# Patient Record
Sex: MALE | Race: WHITE | Employment: OTHER | URBAN - METROPOLITAN AREA
[De-identification: names, ages, dates, MRNs, and addresses within clinical notes are randomized per-mention and may not be internally consistent; named-entity substitution may affect disease eponyms.]

---

## 2019-11-08 ENCOUNTER — APPOINTMENT (OUTPATIENT)
Dept: GENERAL RADIOLOGY | Age: 44
End: 2019-11-08
Attending: EMERGENCY MEDICINE
Payer: OTHER GOVERNMENT

## 2019-11-08 ENCOUNTER — HOSPITAL ENCOUNTER (EMERGENCY)
Age: 44
Discharge: HOME | End: 2019-11-08
Attending: EMERGENCY MEDICINE
Payer: OTHER GOVERNMENT

## 2019-11-08 VITALS
WEIGHT: 200 LBS | OXYGEN SATURATION: 99 % | SYSTOLIC BLOOD PRESSURE: 130 MMHG | DIASTOLIC BLOOD PRESSURE: 91 MMHG | HEIGHT: 68 IN | RESPIRATION RATE: 16 BRPM | TEMPERATURE: 98 F | HEART RATE: 83 BPM | BODY MASS INDEX: 30.31 KG/M2

## 2019-11-08 DIAGNOSIS — R42 EPISODIC LIGHTHEADEDNESS: Primary | ICD-10-CM

## 2019-11-08 PROCEDURE — 99285 EMERGENCY DEPT VISIT HI MDM: CPT

## 2019-11-08 PROCEDURE — 93010 ELECTROCARDIOGRAM REPORT: CPT

## 2019-11-08 PROCEDURE — 80048 BASIC METABOLIC PNL TOTAL CA: CPT | Performed by: EMERGENCY MEDICINE

## 2019-11-08 PROCEDURE — 99284 EMERGENCY DEPT VISIT MOD MDM: CPT

## 2019-11-08 PROCEDURE — 84484 ASSAY OF TROPONIN QUANT: CPT | Performed by: EMERGENCY MEDICINE

## 2019-11-08 PROCEDURE — 36415 COLL VENOUS BLD VENIPUNCTURE: CPT

## 2019-11-08 PROCEDURE — 85025 COMPLETE CBC W/AUTO DIFF WBC: CPT | Performed by: EMERGENCY MEDICINE

## 2019-11-08 PROCEDURE — 71046 X-RAY EXAM CHEST 2 VIEWS: CPT | Performed by: EMERGENCY MEDICINE

## 2019-11-08 PROCEDURE — 93005 ELECTROCARDIOGRAM TRACING: CPT

## 2019-11-08 RX ORDER — IBUPROFEN 400 MG/1
400 TABLET ORAL EVERY 6 HOURS PRN
COMMUNITY

## 2019-11-08 NOTE — ED INITIAL ASSESSMENT (HPI)
Pt started having lightheadedness today this afternoon felt his heart was beating fast. Felt a little sob not now but earlier when lightheaded. No nausea.

## 2019-11-08 NOTE — ED PROVIDER NOTES
Patient Seen in: [facility] Emergency Department In [place]      History   Patient presents with:  Lightheadedness    Stated Complaint: lightheaded     HPI    Patient notes a remarkable recent medical history of a sprained ankle.   He went to an urgent car None (Room air)       Current:BP (!) 130/91   Pulse 83   Temp 98.3 °F (36.8 °C) (Temporal)   Resp 16   Ht 172.7 cm (5' 8\")   Wt 90.7 kg   SpO2 99%   BMI 30.41 kg/m²         Physical Exam  General: The patient is awake, alert, conversant.   Patient answers computerized EKG interval interpretations. EKG shows sinus rhythm. There are no acute ST changes to suggest acute ischemia or infarct  Ventricular rate 84 bpm. NH interval 144 ms. QRS duration 102 ms.           MDM     Patient's feeling of lightheadedness,

## 2023-10-14 ENCOUNTER — OFFICE VISIT (OUTPATIENT)
Dept: FAMILY MEDICINE CLINIC | Facility: CLINIC | Age: 48
End: 2023-10-14
Payer: OTHER GOVERNMENT

## 2023-10-14 VITALS
WEIGHT: 210 LBS | HEIGHT: 68 IN | TEMPERATURE: 97 F | DIASTOLIC BLOOD PRESSURE: 70 MMHG | BODY MASS INDEX: 31.83 KG/M2 | RESPIRATION RATE: 16 BRPM | HEART RATE: 82 BPM | SYSTOLIC BLOOD PRESSURE: 110 MMHG | OXYGEN SATURATION: 99 %

## 2023-10-14 DIAGNOSIS — L50.9 HIVES: Primary | ICD-10-CM

## 2023-10-14 DIAGNOSIS — R21 RASH AND OTHER NONSPECIFIC SKIN ERUPTION: ICD-10-CM

## 2023-10-14 LAB
OPERATOR ID: NORMAL
POCT LOT NUMBER: NORMAL
RAPID SARS-COV-2 BY PCR: NOT DETECTED

## 2023-10-14 RX ORDER — PREDNISONE 20 MG/1
40 TABLET ORAL DAILY
Qty: 10 TABLET | Refills: 0 | Status: SHIPPED | OUTPATIENT
Start: 2023-10-14 | End: 2023-10-19

## 2024-12-05 ENCOUNTER — APPOINTMENT (OUTPATIENT)
Dept: GENERAL RADIOLOGY | Facility: HOSPITAL | Age: 49
End: 2024-12-05
Payer: OTHER GOVERNMENT

## 2024-12-05 ENCOUNTER — HOSPITAL ENCOUNTER (EMERGENCY)
Facility: HOSPITAL | Age: 49
Discharge: HOME | End: 2024-12-05
Attending: STUDENT IN AN ORGANIZED HEALTH CARE EDUCATION/TRAINING PROGRAM
Payer: OTHER GOVERNMENT

## 2024-12-05 VITALS
SYSTOLIC BLOOD PRESSURE: 127 MMHG | RESPIRATION RATE: 21 BRPM | OXYGEN SATURATION: 100 % | WEIGHT: 205 LBS | DIASTOLIC BLOOD PRESSURE: 96 MMHG | TEMPERATURE: 98 F | BODY MASS INDEX: 31 KG/M2 | HEART RATE: 72 BPM

## 2024-12-05 DIAGNOSIS — R07.9 CHEST PAIN OF UNCERTAIN ETIOLOGY: Primary | ICD-10-CM

## 2024-12-05 LAB
ALBUMIN SERPL-MCNC: 4.7 G/DL (ref 3.2–4.8)
ALBUMIN/GLOB SERPL: 1.3 {RATIO} (ref 1–2)
ALP LIVER SERPL-CCNC: 68 U/L
ALT SERPL-CCNC: 41 U/L
ANION GAP SERPL CALC-SCNC: 6 MMOL/L (ref 0–18)
APTT PPP: 27.9 SECONDS (ref 23–36)
AST SERPL-CCNC: 30 U/L (ref ?–34)
BASOPHILS # BLD AUTO: 0.05 X10(3) UL (ref 0–0.2)
BASOPHILS NFR BLD AUTO: 0.9 %
BILIRUB SERPL-MCNC: 0.9 MG/DL (ref 0.3–1.2)
BUN BLD-MCNC: 10 MG/DL (ref 9–23)
CALCIUM BLD-MCNC: 9.8 MG/DL (ref 8.7–10.4)
CHLORIDE SERPL-SCNC: 103 MMOL/L (ref 98–112)
CO2 SERPL-SCNC: 29 MMOL/L (ref 21–32)
CREAT BLD-MCNC: 0.87 MG/DL
EGFRCR SERPLBLD CKD-EPI 2021: 106 ML/MIN/1.73M2 (ref 60–?)
EOSINOPHIL # BLD AUTO: 0.14 X10(3) UL (ref 0–0.7)
EOSINOPHIL NFR BLD AUTO: 2.6 %
ERYTHROCYTE [DISTWIDTH] IN BLOOD BY AUTOMATED COUNT: 11.9 %
FLUAV + FLUBV RNA SPEC NAA+PROBE: NEGATIVE
FLUAV + FLUBV RNA SPEC NAA+PROBE: NEGATIVE
GLOBULIN PLAS-MCNC: 3.5 G/DL (ref 2–3.5)
GLUCOSE BLD-MCNC: 85 MG/DL (ref 70–99)
HCT VFR BLD AUTO: 42.1 %
HGB BLD-MCNC: 15.2 G/DL
IMM GRANULOCYTES # BLD AUTO: 0.02 X10(3) UL (ref 0–1)
IMM GRANULOCYTES NFR BLD: 0.4 %
INR BLD: 0.99 (ref 0.8–1.2)
LYMPHOCYTES # BLD AUTO: 0.77 X10(3) UL (ref 1–4)
LYMPHOCYTES NFR BLD AUTO: 14 %
MCH RBC QN AUTO: 32.3 PG (ref 26–34)
MCHC RBC AUTO-ENTMCNC: 36.1 G/DL (ref 31–37)
MCV RBC AUTO: 89.6 FL
MONOCYTES # BLD AUTO: 0.53 X10(3) UL (ref 0.1–1)
MONOCYTES NFR BLD AUTO: 9.7 %
NEUTROPHILS # BLD AUTO: 3.98 X10 (3) UL (ref 1.5–7.7)
NEUTROPHILS # BLD AUTO: 3.98 X10(3) UL (ref 1.5–7.7)
NEUTROPHILS NFR BLD AUTO: 72.4 %
NT-PROBNP SERPL-MCNC: <35 PG/ML (ref ?–125)
OSMOLALITY SERPL CALC.SUM OF ELEC: 284 MOSM/KG (ref 275–295)
PLATELET # BLD AUTO: 184 10(3)UL (ref 150–450)
POTASSIUM SERPL-SCNC: 3.6 MMOL/L (ref 3.5–5.1)
PROT SERPL-MCNC: 8.2 G/DL (ref 5.7–8.2)
PROTHROMBIN TIME: 13.2 SECONDS (ref 11.6–14.8)
RBC # BLD AUTO: 4.7 X10(6)UL
RSV RNA SPEC NAA+PROBE: NEGATIVE
SARS-COV-2 RNA RESP QL NAA+PROBE: NOT DETECTED
SODIUM SERPL-SCNC: 138 MMOL/L (ref 136–145)
TROPONIN I SERPL HS-MCNC: 3 NG/L
WBC # BLD AUTO: 5.5 X10(3) UL (ref 4–11)

## 2024-12-05 PROCEDURE — 83880 ASSAY OF NATRIURETIC PEPTIDE: CPT

## 2024-12-05 PROCEDURE — 85025 COMPLETE CBC W/AUTO DIFF WBC: CPT

## 2024-12-05 PROCEDURE — 85610 PROTHROMBIN TIME: CPT | Performed by: STUDENT IN AN ORGANIZED HEALTH CARE EDUCATION/TRAINING PROGRAM

## 2024-12-05 PROCEDURE — 99284 EMERGENCY DEPT VISIT MOD MDM: CPT

## 2024-12-05 PROCEDURE — 85730 THROMBOPLASTIN TIME PARTIAL: CPT

## 2024-12-05 PROCEDURE — 36415 COLL VENOUS BLD VENIPUNCTURE: CPT

## 2024-12-05 PROCEDURE — 93010 ELECTROCARDIOGRAM REPORT: CPT

## 2024-12-05 PROCEDURE — 85025 COMPLETE CBC W/AUTO DIFF WBC: CPT | Performed by: STUDENT IN AN ORGANIZED HEALTH CARE EDUCATION/TRAINING PROGRAM

## 2024-12-05 PROCEDURE — 80053 COMPREHEN METABOLIC PANEL: CPT

## 2024-12-05 PROCEDURE — 0241U SARS-COV-2/FLU A AND B/RSV BY PCR (GENEXPERT): CPT | Performed by: STUDENT IN AN ORGANIZED HEALTH CARE EDUCATION/TRAINING PROGRAM

## 2024-12-05 PROCEDURE — 93005 ELECTROCARDIOGRAM TRACING: CPT

## 2024-12-05 PROCEDURE — 84484 ASSAY OF TROPONIN QUANT: CPT | Performed by: STUDENT IN AN ORGANIZED HEALTH CARE EDUCATION/TRAINING PROGRAM

## 2024-12-05 PROCEDURE — 85730 THROMBOPLASTIN TIME PARTIAL: CPT | Performed by: STUDENT IN AN ORGANIZED HEALTH CARE EDUCATION/TRAINING PROGRAM

## 2024-12-05 PROCEDURE — 83880 ASSAY OF NATRIURETIC PEPTIDE: CPT | Performed by: STUDENT IN AN ORGANIZED HEALTH CARE EDUCATION/TRAINING PROGRAM

## 2024-12-05 PROCEDURE — 71045 X-RAY EXAM CHEST 1 VIEW: CPT

## 2024-12-05 PROCEDURE — 84484 ASSAY OF TROPONIN QUANT: CPT

## 2024-12-05 PROCEDURE — 99285 EMERGENCY DEPT VISIT HI MDM: CPT

## 2024-12-05 PROCEDURE — 80053 COMPREHEN METABOLIC PANEL: CPT | Performed by: STUDENT IN AN ORGANIZED HEALTH CARE EDUCATION/TRAINING PROGRAM

## 2024-12-05 PROCEDURE — 85610 PROTHROMBIN TIME: CPT

## 2024-12-05 NOTE — ED INITIAL ASSESSMENT (HPI)
Pt to ER for mike and chest tightness x3 days. Pt reports checking his O2 sat before coming to ER and states he was at 90% at rest. Pt states he does feel mike with exertion but does not feel his normal self at rest either.

## 2024-12-06 NOTE — ED PROVIDER NOTES
History     Chief Complaint   Patient presents with    Difficulty Breathing    Chest Pain Angina       HPI    49 year old male presents with chest tightness.  For about 3 days now he has been feeling central chest tightness when he exerts himself, occasionally associated dyspnea.  No associated nausea or diaphoresis.  He sometimes feels similar symptoms with eating, has a prior history of eosinophilic esophagitis.  Not currently on antiacids.  He denies any cardiac disease in his father at his age.  No history of hypertension, hyperlipidemia, diabetes.  Wife does state that he has been under more stress recently not sleeping well.  Patient is asymptomatic at this time.          History reviewed. No pertinent past medical history.    History reviewed. No pertinent surgical history.    No pertinent social history.                 Physical Exam     ED Triage Vitals   BP 12/05/24 1706 152/84   Pulse 12/05/24 1706 84   Resp 12/05/24 1706 18   Temp 12/05/24 1703 97.7 °F (36.5 °C)   Temp src 12/05/24 1703 Temporal   SpO2 12/05/24 1706 100 %   O2 Device 12/05/24 1706 None (Room air)       Physical Exam  Constitutional:       General: He is not in acute distress.  Eyes:      Extraocular Movements: Extraocular movements intact.   Cardiovascular:      Rate and Rhythm: Normal rate and regular rhythm.      Pulses: Normal pulses.   Pulmonary:      Effort: Pulmonary effort is normal. No respiratory distress.      Breath sounds: Normal breath sounds.   Musculoskeletal:         General: No swelling or deformity.      Cervical back: Normal range of motion.   Skin:     General: Skin is warm.   Neurological:      General: No focal deficit present.      Mental Status: He is alert.              ED Course     Labs Reviewed   CBC WITH DIFFERENTIAL WITH PLATELET - Abnormal; Notable for the following components:       Result Value    Lymphocyte Absolute 0.77 (*)     All other components within normal limits   COMP METABOLIC PANEL (14) -  Normal   TROPONIN I HIGH SENSITIVITY - Normal   PRO BETA NATRIURETIC PEPTIDE - Normal   PROTHROMBIN TIME (PT) - Normal   PTT, ACTIVATED - Normal   SARS-COV-2/FLU A AND B/RSV BY PCR (GENEXPERT) - Normal    Narrative:     This test is intended for the qualitative detection and differentiation of SARS-CoV-2, influenza A, influenza B, and respiratory syncytial virus (RSV) viral RNA in nasopharyngeal or nares swabs from individuals suspected of respiratory viral infection consistent with COVID-19 by their healthcare provider. Signs and symptoms of respiratory viral infection due to SARS-CoV-2, influenza, and RSV can be similar.    Test performed using the Xpert Xpress SARS-CoV-2/FLU/RSV (real time RT-PCR)  assay on the [employer] instrument, [employer], [place] [address].   This test is being used under the Food and Drug Administration's Emergency Use Authorization.    The authorized Fact Sheet for Healthcare Providers for this assay is available upon request from the laboratory.     XR CHEST AP PORTABLE  (CPT=71045)    Result Date: 12/5/2024  CONCLUSION: No acute cardiopulmonary abnormality.   LOCATION:  [facility]      Dictated by (CST): Ariel Turk MD on 12/05/2024 at 5:54 PM     Finalized by (CST): Ariel Turk MD on 12/05/2024 at 5:54 PM            MDM     Vitals:    12/05/24 1706 12/05/24 2000 12/05/24 2030 12/05/24 2100   BP: 152/84 (!) 135/92 (!) 132/91 (!) 127/96   Pulse: 84 78 74 72   Resp: 18 13 23 21   Temp:       TempSrc:       SpO2: 100% 100% 100% 100%   Weight: 93 kg          Chest pain/dyspnea.  Differential includes angina, GERD, pleurisy, costochondritis, anemia.  Patient is moving air well with clear lungs, no other traditional risk factors.  PERC negative r/o PE    HEART 2    ED Course as of 12/05/24 2151  ------------------------------------------------------------  Time: 12/05 2015  Comment: Labs with reassuring hemoglobin and renal function.  Troponin negative.  BNP is  low  ------------------------------------------------------------  Time: 12/05 2015  Comment: CXR interpretation by me with no concerning acute findings    ------------------------------------------------------------  Time: 12/05 2018  Comment: EKG interpretation by me: EKG sinus rhythm at a rate of 87, axis nomal, no concerning acute ischemic ST changes, incomplete rbbb appears similar to prior EKGs    ------------------------------------------------------------  Time: 12/05 2107  Comment: I had a very long discussion with patient and wife at bedside regarding his results and his workup.  Advised that the next steps in management would likely be a stress test, offered admission given his symptoms versus close outpatient follow-up with [facility].  Patient prefers to go home.  I discussed all risks and benefits and he understands.  Strict return precautions given.  I consulted with [facility] to help expedite workup for patient.  He is hemodynamically stable and remains comfortable here.         Disposition and Plan     Clinical Impression:  1. Chest pain of uncertain etiology        Disposition:  Discharge    Follow-up:  RUBY SYLVESTER  [address] [place] [id number] [phone]  Follow up  Cardiology group will call you tomorrow for your appointment for the stress test and follow-up appointment      Medications Prescribed:  Discharge Medication List as of 12/5/2024  9:24 PM

## 2024-12-07 LAB
ATRIAL RATE: 87 BPM
P AXIS: 40 DEGREES
P-R INTERVAL: 142 MS
Q-T INTERVAL: 368 MS
QRS DURATION: 102 MS
QTC CALCULATION (BEZET): 442 MS
R AXIS: 32 DEGREES
T AXIS: 22 DEGREES
VENTRICULAR RATE: 87 BPM

## 2024-12-18 ENCOUNTER — HOSPITAL ENCOUNTER (INPATIENT)
Facility: HOSPITAL | Age: 49
LOS: 9 days | Discharge: HOME HEALTH | End: 2024-12-27
Attending: EMERGENCY MEDICINE | Admitting: HOSPITALIST
Payer: OTHER GOVERNMENT

## 2024-12-18 ENCOUNTER — APPOINTMENT (OUTPATIENT)
Dept: GENERAL RADIOLOGY | Facility: HOSPITAL | Age: 49
End: 2024-12-18
Attending: EMERGENCY MEDICINE
Payer: OTHER GOVERNMENT

## 2024-12-18 DIAGNOSIS — R07.9 ACUTE CHEST PAIN: Primary | ICD-10-CM

## 2024-12-18 DIAGNOSIS — J90 PLEURAL EFFUSION: ICD-10-CM

## 2024-12-18 DIAGNOSIS — G89.18 POST-OP PAIN: ICD-10-CM

## 2024-12-18 LAB
ALBUMIN SERPL-MCNC: 4.6 G/DL (ref 3.2–4.8)
ALBUMIN/GLOB SERPL: 1.6 {RATIO} (ref 1–2)
ALP LIVER SERPL-CCNC: 63 U/L
ALT SERPL-CCNC: 40 U/L
ANION GAP SERPL CALC-SCNC: 5 MMOL/L (ref 0–18)
AST SERPL-CCNC: 31 U/L (ref ?–34)
ATRIAL RATE: 66 BPM
BASOPHILS # BLD AUTO: 0.03 X10(3) UL (ref 0–0.2)
BASOPHILS NFR BLD AUTO: 0.6 %
BILIRUB SERPL-MCNC: 1.1 MG/DL (ref 0.3–1.2)
BUN BLD-MCNC: 11 MG/DL (ref 9–23)
CALCIUM BLD-MCNC: 9.6 MG/DL (ref 8.7–10.4)
CHLORIDE SERPL-SCNC: 108 MMOL/L (ref 98–112)
CO2 SERPL-SCNC: 26 MMOL/L (ref 21–32)
CREAT BLD-MCNC: 0.9 MG/DL
D DIMER PPP FEU-MCNC: 0.41 UG/ML FEU (ref ?–0.5)
EGFRCR SERPLBLD CKD-EPI 2021: 105 ML/MIN/1.73M2 (ref 60–?)
EOSINOPHIL # BLD AUTO: 0.19 X10(3) UL (ref 0–0.7)
EOSINOPHIL NFR BLD AUTO: 3.8 %
ERYTHROCYTE [DISTWIDTH] IN BLOOD BY AUTOMATED COUNT: 11.9 %
FLUAV + FLUBV RNA SPEC NAA+PROBE: NEGATIVE
FLUAV + FLUBV RNA SPEC NAA+PROBE: NEGATIVE
GLOBULIN PLAS-MCNC: 2.9 G/DL (ref 2–3.5)
GLUCOSE BLD-MCNC: 90 MG/DL (ref 70–99)
HCT VFR BLD AUTO: 41.4 %
HGB BLD-MCNC: 14.5 G/DL
IMM GRANULOCYTES # BLD AUTO: 0.01 X10(3) UL (ref 0–1)
IMM GRANULOCYTES NFR BLD: 0.2 %
LYMPHOCYTES # BLD AUTO: 1.11 X10(3) UL (ref 1–4)
LYMPHOCYTES NFR BLD AUTO: 22.3 %
MCH RBC QN AUTO: 31.5 PG (ref 26–34)
MCHC RBC AUTO-ENTMCNC: 35 G/DL (ref 31–37)
MCV RBC AUTO: 90 FL
MONOCYTES # BLD AUTO: 0.36 X10(3) UL (ref 0.1–1)
MONOCYTES NFR BLD AUTO: 7.2 %
NEUTROPHILS # BLD AUTO: 3.28 X10 (3) UL (ref 1.5–7.7)
NEUTROPHILS # BLD AUTO: 3.28 X10(3) UL (ref 1.5–7.7)
NEUTROPHILS NFR BLD AUTO: 65.9 %
NT-PROBNP SERPL-MCNC: <35 PG/ML (ref ?–125)
OSMOLALITY SERPL CALC.SUM OF ELEC: 287 MOSM/KG (ref 275–295)
P AXIS: 23 DEGREES
P-R INTERVAL: 152 MS
PLATELET # BLD AUTO: 216 10(3)UL (ref 150–450)
POTASSIUM SERPL-SCNC: 3.7 MMOL/L (ref 3.5–5.1)
PROT SERPL-MCNC: 7.5 G/DL (ref 5.7–8.2)
Q-T INTERVAL: 404 MS
QRS DURATION: 104 MS
QTC CALCULATION (BEZET): 423 MS
R AXIS: 32 DEGREES
RBC # BLD AUTO: 4.6 X10(6)UL
RSV RNA SPEC NAA+PROBE: NEGATIVE
SARS-COV-2 RNA RESP QL NAA+PROBE: NOT DETECTED
SODIUM SERPL-SCNC: 139 MMOL/L (ref 136–145)
T AXIS: 23 DEGREES
TROPONIN I SERPL HS-MCNC: <3 NG/L
TROPONIN I SERPL HS-MCNC: <3 NG/L
VENTRICULAR RATE: 66 BPM
WBC # BLD AUTO: 5 X10(3) UL (ref 4–11)

## 2024-12-18 PROCEDURE — 71045 X-RAY EXAM CHEST 1 VIEW: CPT | Performed by: EMERGENCY MEDICINE

## 2024-12-18 PROCEDURE — 99223 1ST HOSP IP/OBS HIGH 75: CPT | Performed by: HOSPITALIST

## 2024-12-18 RX ORDER — SODIUM CHLORIDE 9 MG/ML
INJECTION, SOLUTION INTRAVENOUS CONTINUOUS
Status: DISCONTINUED | OUTPATIENT
Start: 2024-12-18 | End: 2024-12-24

## 2024-12-18 RX ORDER — POTASSIUM CHLORIDE 1500 MG/1
40 TABLET, EXTENDED RELEASE ORAL ONCE
Status: COMPLETED | OUTPATIENT
Start: 2024-12-18 | End: 2024-12-18

## 2024-12-18 RX ORDER — SODIUM CHLORIDE 9 MG/ML
INJECTION, SOLUTION INTRAVENOUS
Status: ACTIVE | OUTPATIENT
Start: 2024-12-19 | End: 2024-12-19

## 2024-12-18 RX ORDER — ECHINACEA PURPUREA EXTRACT 125 MG
1 TABLET ORAL
Status: DISCONTINUED | OUTPATIENT
Start: 2024-12-18 | End: 2024-12-27

## 2024-12-18 RX ORDER — PROCHLORPERAZINE EDISYLATE 5 MG/ML
5 INJECTION INTRAMUSCULAR; INTRAVENOUS EVERY 8 HOURS PRN
Status: DISCONTINUED | OUTPATIENT
Start: 2024-12-18 | End: 2024-12-24

## 2024-12-18 RX ORDER — ENOXAPARIN SODIUM 100 MG/ML
40 INJECTION SUBCUTANEOUS NIGHTLY
Status: DISCONTINUED | OUTPATIENT
Start: 2024-12-18 | End: 2024-12-19

## 2024-12-18 RX ORDER — METOPROLOL TARTRATE 25 MG/1
12.5 TABLET, FILM COATED ORAL 2 TIMES DAILY
COMMUNITY
Start: 2024-12-13 | End: 2024-12-27

## 2024-12-18 RX ORDER — ATORVASTATIN CALCIUM 80 MG/1
80 TABLET, FILM COATED ORAL DAILY
COMMUNITY

## 2024-12-18 RX ORDER — CLOPIDOGREL BISULFATE 75 MG/1
75 TABLET ORAL DAILY
COMMUNITY

## 2024-12-18 RX ORDER — ACETAMINOPHEN 500 MG
500 TABLET ORAL EVERY 4 HOURS PRN
Status: DISCONTINUED | OUTPATIENT
Start: 2024-12-18 | End: 2024-12-24

## 2024-12-18 RX ORDER — ONDANSETRON 2 MG/ML
4 INJECTION INTRAMUSCULAR; INTRAVENOUS EVERY 6 HOURS PRN
Status: DISCONTINUED | OUTPATIENT
Start: 2024-12-18 | End: 2024-12-24

## 2024-12-18 NOTE — PROGRESS NOTES
Call received from Annabelle ([email or web address]). Patient is a [facility] patient. [facility] with no available beds at this time. Call placed to 72 hour line and insurance authorization obtained for 30 days. If patient stays longer than 30 days will need re-authorization.   72 hour line #: [phone]  [place] authorization #: [id number]  Notification ID #: [id number]

## 2024-12-18 NOTE — ED PROVIDER NOTES
Patient Seen in: [facility] Emergency Department      History     Chief Complaint   Patient presents with    Difficulty Breathing     98% RA 80 HR  States he feels short of breath    Abnormal EKG     States he had an abnormal stress test on Monday. But no action was taken at the time.      Stated Complaint: WILLIE    Subjective:   HPI      49-year-old male complaint of chest pain the patient had a [facility] medicine stress test that showed some abnormality with reversible ischemia in the distribution of the mid LAD.  He has been having intermittent chest discomfort sometimes exertional over the last couple of weeks it comes and goes lasts only a couple minutes does not seem to be food related he does have a history of reflux.  No nausea vomiting diaphoresis no fever.  He has family history of heart disease but [place] any other risk factors.    Objective:     Past Medical History:    GERD (gastroesophageal reflux disease)              History reviewed. No pertinent surgical history.             Social History     Socioeconomic History    Marital status: Single   Tobacco Use    Smoking status: Never   Vaping Use    Vaping status: Never Used   Substance and Sexual Activity    Alcohol use: Yes     Comment: 1-2 drinks a day    Drug use: Yes     Types: Cannabis     Comment: CBD gummies 1 a day                  Physical Exam     ED Triage Vitals [12/18/24 0740]   /85   Pulse 69   Resp 22   Temp 97.6 °F (36.4 °C)   Temp src    SpO2 98 %   O2 Device None (Room air)       Current Vitals:   Vital Signs  BP: 141/88  Pulse: 72  Resp: 20  Temp: 97.6 °F (36.4 °C)  MAP (mmHg): (!) 104    Oxygen Therapy  SpO2: 100 %  O2 Device: None (Room air)        Physical Exam  Alert and oriented ×3 in no acute distress.  HEENT exam within normal limits.  Neck supple without lymphadenopathy or JVD.  Lungs are clear to auscultation.  Cardiovascular exam regular rate and  rhythm without murmurs.  Abdomen is soft and nontender without rebound or guarding.  Extremities no clubbing cyanosis or edema.  Skin there is no rash.  Neurologic exam is within normal limits no focal deficits.    ED Course     Labs Reviewed   COMP METABOLIC PANEL (14) - Normal   TROPONIN I HIGH SENSITIVITY - Normal   TROPONIN I HIGH SENSITIVITY - Normal   D-DIMER - Normal   PRO BETA NATRIURETIC PEPTIDE - Normal   SARS-COV-2/FLU A AND B/RSV BY PCR (GENEXPERT) - Normal    Narrative:     This test is intended for the qualitative detection and differentiation of SARS-CoV-2, influenza A, influenza B, and respiratory syncytial virus (RSV) viral RNA in nasopharyngeal or nares swabs from individuals suspected of respiratory viral infection consistent with COVID-19 by their healthcare provider. Signs and symptoms of respiratory viral infection due to SARS-CoV-2, influenza, and RSV can be similar.    Test performed using the Xpert Xpress SARS-CoV-2/FLU/RSV (real time RT-PCR)  assay on the [employer] instrument, [employer], [place] [address].   This test is being used under the Food and Drug Administration's Emergency Use Authorization.    The authorized Fact Sheet for Healthcare Providers for this assay is available upon request from the laboratory.   CBC WITH DIFFERENTIAL WITH PLATELET   RAINBOW DRAW GOLD     EKG    Rate, intervals and axes as noted on EKG Report.  Rate: 66  Rhythm: Sinus Rhythm  Reading: Incomplete right bundle branch block borderline EKG            Abnormal Labs Reviewed - No abnormal labs to display  Abnormal Labs Reviewed - No abnormal labs to display      XR CHEST AP PORTABLE  (CPT=71045)    Result Date: 12/18/2024  CONCLUSION:  No acute radiographic findings.   LOCATION:  [facility]      Dictated by (CST): Ariel Villarreal MD on 12/18/2024 at 9:36 AM     Finalized by (CST): Ariel Villarreal MD on 12/18/2024 at 9:37 AM       XR CHEST AP PORTABLE  (CPT=71045)    Result Date: 12/5/2024  CONCLUSION: No acute  cardiopulmonary abnormality.   LOCATION:  [facility]      Dictated by (CST): Ariel Turk MD on 12/05/2024 at 5:54 PM     Finalized by (CST): Ariel Turk MD on 12/05/2024 at 5:54 PM      Chest x-ray unremarkable images independent reviewed       MDM      Initial differential diagnosis considered but not limited to includes GERD reflux ACS pulmonary embolus muscular chest pain    Admission disposition: 12/18/2024 10:15 AM           Medical Decision Making  Patient has intermittent chest pain with exertion and abnormal stress test was admitted with cardiology on consult.    Disposition and Plan     Clinical Impression:  1. Acute chest pain         Disposition:  Admit  12/18/2024 10:15 am    Follow-up:  No follow-up provider specified.        Medications Prescribed:  Current Discharge Medication List              Supplementary Documentation:         Hospital Problems       Present on Admission  Date Reviewed: 10/14/2023            ICD-10-CM Noted POA    * (Principal) Acute chest pain R07.9 12/18/2024 Unknown

## 2024-12-18 NOTE — ED INITIAL ASSESSMENT (HPI)
Pt had a stress test done on Monday that had an abnormal result of a blockage in the left anterior descending artery. They told him that they would call to schedule an angiogram but they haven't yet which left him and his family worried. Pt says he feels intermittent chest pain that he's had for about 2 weeks which is what had him take the stress test in general. Pt feels short of breath but not any more short of breath than when this all started. Pt's EKG shows incomplete right bundle branch block.

## 2024-12-18 NOTE — PLAN OF CARE
The plan is to move forward with aspirin desensitization tomorrow am. Will transfer to ccu tomorrow morning just prior to shift change. CCU charge nurses and nursing supervisor aware. Order placed for the transfer.  Will enter orders for desensitization tomorrow morning when I get in.  Keep NPO. Plan will be for [facility] tomorrow afternoon if all goes well.     Treatment team updated including current RN, Dr Farrell, CCU team and nursing supervisor. I have reviewed with CCU pharmacist as well. Did attempt to consult on call allergist but no response from on call physician.     Leslie Mujica NP  12/18/2024  3:30 PM  [phone]      ]

## 2024-12-18 NOTE — ED QUICK NOTES
Orders for admission, patient is aware of plan and ready to go upstairs. Any questions, please call ED RN Rosina EVERETT at extension [phone].     Patient Covid vaccination status: Fully vaccinated     COVID Test Ordered in ED: SARS-CoV-2/Flu A and B/RSV by PCR (GeneXpert)    COVID Suspicion at Admission: N/A    Running Infusions:  None    Mental Status/LOC at time of transport: Aox4; extensive review w/pt and family done in ED regarding pt dx, medications, and concerns    Other pertinent information: Family at bedside      CIWA score: N/A   NIH score:  N/A

## 2024-12-18 NOTE — CONSULTS
[facility]  Cardiology Consultation    Drew Ozuna Patient Status:  Inpatient    1975 MRN [id number]   Location [facility] 8NE-A Attending Marcelino Madrid*   Hosp Day # 0 PCP PHYSICIAN NONSTAFF     Reason for Consultation:  Abnormal stress test, Chest pain    History of Present Illness:  Drew Ozuna is a a(n) 49 year old male who presents with chest pain.     He has noticed ongoing worsening chest pain over the past 2 - 3 weeks. Symptoms are exertional. Different from his reflux.   He was in the emergency room earlier this month with chest pain. He [facility] He was noted to have an abnormal stress study and was planned for a cardiac catheterization. However, he had episodes of chest pain - last one last night and decided to come to ER with worsening symptoms.   Wife and mother at bedside. Mom just had CABG done      History:  Past Medical History:    GERD (gastroesophageal reflux disease)    Visual impairment     History reviewed. No pertinent surgical history.  Family History   Problem Relation Age of Onset    Diabetes Mother     Heart Disease Mother       reports that he has never smoked. He does not have any smokeless tobacco history on file. He reports current alcohol use. He reports current drug use. Drug: Cannabis.    Allergies:  Allergies[1]    Medications:  No current facility-administered medications for this encounter.    Review of Systems:  A comprehensive review of systems was negative if not otherwise mention in above HPI.    BP (!) 119/94 (BP Location: Left arm)   Pulse 76   Temp 97.9 °F (36.6 °C) (Oral)   Resp 20   Ht 5' 8\" (1.727 m)   Wt 198 lb 6.6 oz (90 kg)   SpO2 99%   BMI 30.17 kg/m²   Temp (24hrs), Av.8 °F (36.6 °C), Min:97.6 °F (36.4 °C), Max:97.9 °F (36.6 °C)     No intake or output data in the 24 hours ending 24 1258  Wt Readings from Last 3 Encounters:    12/18/24 198 lb 6.6 oz (90 kg)   12/05/24 205 lb (93 kg)   10/14/23 210 lb (95.3 kg)       Physical Exam:   General: Alert and oriented x 3. No apparent distress. No respiratory or constitutional distress.  HEENT: Normocephalic  Neck: No JVD  Cardiac: Regular rate and rhythm. S1, S2 normal. No murmur  Lungs: Clear without wheezes, rales, rhonchi or dullness.  Abdomen: Soft, non-tender.   Extremities: Without clubbing, cyanosis or edema.  Peripheral pulses are 2+.  Neurologic: Alert and oriented, normal affect.  Skin: Warm and dry.     Laboratory Data:  Lab Results   Component Value Date    WBC 5.0 12/18/2024    HGB 14.5 12/18/2024    HCT 41.4 12/18/2024    .0 12/18/2024    CREATSERUM 0.90 12/18/2024    BUN 11 12/18/2024     12/18/2024    K 3.7 12/18/2024     12/18/2024    CO2 26.0 12/18/2024    GLU 90 12/18/2024    CA 9.6 12/18/2024    ALB 4.6 12/18/2024    ALKPHO 63 12/18/2024    BILT 1.1 12/18/2024    TP 7.5 12/18/2024    AST 31 12/18/2024    ALT 40 12/18/2024    DDIMER 0.41 12/18/2024     Recent Labs   Lab 12/18/24  0826 12/18/24  1020   TROPHS <3 <3     Imaging:  Reviewed    Impression:    Chest pain/Unstable angina  Abnormal stress test  Aspirin allergy    Recommendations:    Patient reports chest pain is recurrent and increasing in frequency.  He had a stress test which was abnormal.  Patient presentation is concerning for unstable angina.  Chest pain was last night.  Troponin negative.  Schedule for cardiac catheterization.  Discussed in detail with patient who is agreeable.    Reports allergy to aspirin.  Describes having swelling in his scrotum since when he was 10 years old.  No difficulty breathing or shortness of breath with aspirin.  Denies anaphylaxis.  Aspirin desensitization prior to cardiac catheterization.  This was discussed in detail with patient and family.  They are in agreement and would wish for desensitization prior to procedure.  Indication, risk and benefits were  discussed in detail.    Hold off Plavix until cardiac catheterization.    Will check an echocardiogram.    Cont to follow.    Thank you for allowing me to participate in the care of your patient.    Aristeo Stovall MD  12/18/2024  12:58 PM         [1]   Allergies  Allergen Reactions    Asa [Aspirin] SWELLING

## 2024-12-18 NOTE — ED QUICK NOTES
RN Station report received from LORI Gilbert. Patient is consult w/cardiology and results at this time. Plan of care reviewed. Detailed walk through of care, medications, and diagnosis discussed w/patient and family by previous RN.

## 2024-12-18 NOTE — CM/SW NOTE
Chart reviewed for discharge planning. Noted that pt received a stress test  on Monday at [facility]. [facility] was supposed to call to schedule an angiogram, but they did not, and pt is now at [facility]. SW reached out to [facility] to inquire if pt needs to transfer to [facility]. Await response from [facility].     Addendum: SW received call from [facility] and stated that [facility] has authorized pt to receive hospital care at [facility] and does not need to transfer. They have authorized pt's stay for 30 days.    Annabelle DIOR, LSW  Discharge Planner

## 2024-12-18 NOTE — H&P
[facility]     Drew Ozuna Patient Status:  Inpatient    1975 MRN [id number]   Location [facility] 8NE-A Attending Marcelino Madrid*   Hosp Day # 0 PCP PHYSICIAN NONSTAFF     Chief Complaint: Chest pain    Subjective:    History of Present Illness:     Drew Ozuna is a 49 year old male with past medical history significant for GERD presents to the ER with c/o chest pain over the past 2-3 weeks.  Pt states the pain is located in the middle of his chest and he describes it as a pressure.  He notices it is worse with exertion.  His mother had a heart attack 3 months.  He was seen in the ER on  and was [facility]  He was told it was abnormal.  He states that he was told he need an angiogram but since then his symptoms have worsened so he came into the ER.  He denies pain at this time.     History/Other:    Past Medical History:  Past Medical History:    GERD (gastroesophageal reflux disease)    Visual impairment     Past Surgical History:   History reviewed. No pertinent surgical history.   Family History:   Family History   Problem Relation Age of Onset    Diabetes Mother     Heart Disease Mother      Social History:    reports that he has never smoked. He does not have any smokeless tobacco history on file. He reports current alcohol use. He reports current drug use. Drug: Cannabis.     Allergies: Allergies[1]    Medications:  Medications Ordered Prior to Encounter[2]    Review of Systems:   A comprehensive review of systems was completed.    Pertinent positives and negatives noted in the HPI.    Objective:   Physical Exam:    BP (!) 119/94 (BP Location: Left arm)   Pulse 76   Temp 97.9 °F (36.6 °C) (Oral)   Resp 20   Ht 5' 8\" (1.727 m)   Wt 198 lb 6.6 oz (90 kg)   SpO2 99%   BMI 30.17 kg/m²   General: No acute distress, Alert  Respiratory: No rhonchi, no wheezes  Cardiovascular:  S1, S2. Regular rate and rhythm  Abdomen: Soft, Non-tender, non-distended, positive bowel sounds  Neuro: No new focal deficits  Extremities: No edema      Results:    Labs:      Labs Last 24 Hours:    Recent Labs   Lab 12/18/24  0826   RBC 4.60   HGB 14.5   HCT 41.4   MCV 90.0   MCH 31.5   MCHC 35.0   RDW 11.9   NEPRELIM 3.28   WBC 5.0   .0       Recent Labs   Lab 12/18/24  0826   GLU 90   BUN 11   CREATSERUM 0.90   EGFRCR 105   CA 9.6   ALB 4.6      K 3.7      CO2 26.0   ALKPHO 63   AST 31   ALT 40   BILT 1.1   TP 7.5       Lab Results   Component Value Date    INR 0.99 12/05/2024       Recent Labs   Lab 12/18/24  0826 12/18/24  1020   TROPHS <3 <3       Recent Labs   Lab 12/18/24  0826   PBNP <35       No results for input(s): \"PCT\" in the last 168 hours.    Imaging: Imaging data reviewed in Epic.    Assessment & Plan:      #Chest pain  Abnormal stress test  Angiogram tomorrow  No need for heparin  Cardiology following  Monitor on telemetry    #GERD    #Obesity, BMI 30        Plan of care discussed with pt and RN.    Keven Farrell MD    Supplementary Documentation:     The 21st Century Cures Act makes medical notes like these available to patients in the interest of transparency. Please be advised this is a medical document. Medical documents are intended to carry relevant information, facts as evident, and the clinical opinion of the practitioner. The medical note is intended as peer to peer communication and may appear blunt or direct. It is written in medical language and may contain abbreviations or verbiage that are unfamiliar.                                       [1]   Allergies  Allergen Reactions    Asa [Aspirin] SWELLING   [2]   No current facility-administered medications on file prior to encounter.     Current Outpatient Medications on File Prior to Encounter   Medication Sig Dispense Refill    metoprolol tartrate 25 MG Oral Tab Take 0.5 tablets (12.5 mg total) by mouth 2 (two) times  daily.      atorvastatin 80 MG Oral Tab Take 1 tablet (80 mg total) by mouth daily.      clopidogrel 75 MG Oral Tab Take 1 tablet (75 mg total) by mouth daily.

## 2024-12-19 ENCOUNTER — APPOINTMENT (OUTPATIENT)
Dept: INTERVENTIONAL RADIOLOGY/VASCULAR | Facility: HOSPITAL | Age: 49
End: 2024-12-19
Attending: NURSE PRACTITIONER
Payer: OTHER GOVERNMENT

## 2024-12-19 ENCOUNTER — APPOINTMENT (OUTPATIENT)
Dept: CV DIAGNOSTICS | Facility: HOSPITAL | Age: 49
End: 2024-12-19
Attending: NURSE PRACTITIONER
Payer: OTHER GOVERNMENT

## 2024-12-19 LAB
ANION GAP SERPL CALC-SCNC: 6 MMOL/L (ref 0–18)
BUN BLD-MCNC: 11 MG/DL (ref 9–23)
CALCIUM BLD-MCNC: 9.2 MG/DL (ref 8.7–10.4)
CHLORIDE SERPL-SCNC: 106 MMOL/L (ref 98–112)
CHOLEST SERPL-MCNC: 190 MG/DL (ref ?–200)
CO2 SERPL-SCNC: 26 MMOL/L (ref 21–32)
CREAT BLD-MCNC: 0.86 MG/DL
EGFRCR SERPLBLD CKD-EPI 2021: 106 ML/MIN/1.73M2 (ref 60–?)
ERYTHROCYTE [DISTWIDTH] IN BLOOD BY AUTOMATED COUNT: 11.7 %
GLUCOSE BLD-MCNC: 111 MG/DL (ref 70–99)
GLUCOSE BLD-MCNC: 89 MG/DL (ref 70–99)
HCT VFR BLD AUTO: 39.3 %
HDLC SERPL-MCNC: 31 MG/DL (ref 40–59)
HGB BLD-MCNC: 13.8 G/DL
LDLC SERPL CALC-MCNC: 132 MG/DL (ref ?–100)
MCH RBC QN AUTO: 31.9 PG (ref 26–34)
MCHC RBC AUTO-ENTMCNC: 35.1 G/DL (ref 31–37)
MCV RBC AUTO: 91 FL
MRSA DNA SPEC QL NAA+PROBE: NEGATIVE
NONHDLC SERPL-MCNC: 159 MG/DL (ref ?–130)
OSMOLALITY SERPL CALC.SUM OF ELEC: 286 MOSM/KG (ref 275–295)
PLATELET # BLD AUTO: 217 10(3)UL (ref 150–450)
POTASSIUM SERPL-SCNC: 3.8 MMOL/L (ref 3.5–5.1)
POTASSIUM SERPL-SCNC: 3.8 MMOL/L (ref 3.5–5.1)
RBC # BLD AUTO: 4.32 X10(6)UL
SODIUM SERPL-SCNC: 138 MMOL/L (ref 136–145)
TRIGL SERPL-MCNC: 150 MG/DL (ref 30–149)
VLDLC SERPL CALC-MCNC: 27 MG/DL (ref 0–30)
WBC # BLD AUTO: 5.6 X10(3) UL (ref 4–11)

## 2024-12-19 PROCEDURE — 93306 TTE W/DOPPLER COMPLETE: CPT | Performed by: NURSE PRACTITIONER

## 2024-12-19 PROCEDURE — B2151ZZ FLUOROSCOPY OF LEFT HEART USING LOW OSMOLAR CONTRAST: ICD-10-PCS | Performed by: INTERNAL MEDICINE

## 2024-12-19 PROCEDURE — B2111ZZ FLUOROSCOPY OF MULTIPLE CORONARY ARTERIES USING LOW OSMOLAR CONTRAST: ICD-10-PCS | Performed by: INTERNAL MEDICINE

## 2024-12-19 PROCEDURE — 4A023N7 MEASUREMENT OF CARDIAC SAMPLING AND PRESSURE, LEFT HEART, PERCUTANEOUS APPROACH: ICD-10-PCS | Performed by: INTERNAL MEDICINE

## 2024-12-19 PROCEDURE — 99233 SBSQ HOSP IP/OBS HIGH 50: CPT | Performed by: HOSPITALIST

## 2024-12-19 RX ORDER — ASPIRIN 100 %
20 POWDER (GRAM) MISCELLANEOUS
Status: COMPLETED | OUTPATIENT
Start: 2024-12-19 | End: 2024-12-19

## 2024-12-19 RX ORDER — ASPIRIN 81 MG/1
1 TABLET, CHEWABLE ORAL
Status: CANCELLED | OUTPATIENT
Start: 2024-12-19 | End: 2024-12-19

## 2024-12-19 RX ORDER — HEPARIN SODIUM 5000 [USP'U]/ML
INJECTION, SOLUTION INTRAVENOUS; SUBCUTANEOUS
Status: COMPLETED
Start: 2024-12-19 | End: 2024-12-19

## 2024-12-19 RX ORDER — HYDROCORTISONE SODIUM SUCCINATE 100 MG/2ML
100 INJECTION INTRAMUSCULAR; INTRAVENOUS ONCE AS NEEDED
Status: DISPENSED | OUTPATIENT
Start: 2024-12-19 | End: 2024-12-19

## 2024-12-19 RX ORDER — POTASSIUM CHLORIDE 1500 MG/1
40 TABLET, EXTENDED RELEASE ORAL ONCE
Status: COMPLETED | OUTPATIENT
Start: 2024-12-19 | End: 2024-12-19

## 2024-12-19 RX ORDER — VERAPAMIL HYDROCHLORIDE 2.5 MG/ML
INJECTION, SOLUTION INTRAVENOUS
Status: COMPLETED
Start: 2024-12-19 | End: 2024-12-19

## 2024-12-19 RX ORDER — LIDOCAINE HYDROCHLORIDE 10 MG/ML
INJECTION, SOLUTION EPIDURAL; INFILTRATION; INTRACAUDAL; PERINEURAL
Status: COMPLETED
Start: 2024-12-19 | End: 2024-12-19

## 2024-12-19 RX ORDER — ASPIRIN 100 %
0.1 POWDER (GRAM) MISCELLANEOUS
Status: COMPLETED | OUTPATIENT
Start: 2024-12-19 | End: 2024-12-19

## 2024-12-19 RX ORDER — ASPIRIN 100 %
3 POWDER (GRAM) MISCELLANEOUS
Status: COMPLETED | OUTPATIENT
Start: 2024-12-19 | End: 2024-12-19

## 2024-12-19 RX ORDER — ASPIRIN 100 %
10 POWDER (GRAM) MISCELLANEOUS
Status: COMPLETED | OUTPATIENT
Start: 2024-12-19 | End: 2024-12-19

## 2024-12-19 RX ORDER — ASPIRIN 100 %
0.3 POWDER (GRAM) MISCELLANEOUS
Status: COMPLETED | OUTPATIENT
Start: 2024-12-19 | End: 2024-12-19

## 2024-12-19 RX ORDER — ASPIRIN 81 MG/1
40.5 TABLET, CHEWABLE ORAL
Status: COMPLETED | OUTPATIENT
Start: 2024-12-19 | End: 2024-12-19

## 2024-12-19 RX ORDER — ASPIRIN 81 MG/1
81 TABLET, CHEWABLE ORAL DAILY
Status: DISCONTINUED | OUTPATIENT
Start: 2024-12-19 | End: 2024-12-19

## 2024-12-19 RX ORDER — DIPHENHYDRAMINE HYDROCHLORIDE 50 MG/ML
25 INJECTION INTRAMUSCULAR; INTRAVENOUS EVERY 6 HOURS PRN
Status: DISPENSED | OUTPATIENT
Start: 2024-12-19 | End: 2024-12-19

## 2024-12-19 RX ORDER — NITROGLYCERIN 20 MG/100ML
INJECTION INTRAVENOUS
Status: COMPLETED
Start: 2024-12-19 | End: 2024-12-19

## 2024-12-19 RX ORDER — ASPIRIN 100 %
1 POWDER (GRAM) MISCELLANEOUS
Status: COMPLETED | OUTPATIENT
Start: 2024-12-19 | End: 2024-12-19

## 2024-12-19 RX ORDER — HEPARIN SODIUM AND DEXTROSE 10000; 5 [USP'U]/100ML; G/100ML
1000 INJECTION INTRAVENOUS ONCE
Status: COMPLETED | OUTPATIENT
Start: 2024-12-19 | End: 2024-12-20

## 2024-12-19 RX ORDER — IOPAMIDOL 755 MG/ML
200 INJECTION, SOLUTION INTRAVASCULAR
Status: COMPLETED | OUTPATIENT
Start: 2024-12-19 | End: 2024-12-19

## 2024-12-19 RX ORDER — ASPIRIN 81 MG/1
81 TABLET ORAL DAILY
Status: DISCONTINUED | OUTPATIENT
Start: 2024-12-20 | End: 2024-12-24

## 2024-12-19 RX ORDER — MIDAZOLAM HYDROCHLORIDE 1 MG/ML
INJECTION INTRAMUSCULAR; INTRAVENOUS
Status: COMPLETED
Start: 2024-12-19 | End: 2024-12-19

## 2024-12-19 RX ORDER — HEPARIN SODIUM AND DEXTROSE 10000; 5 [USP'U]/100ML; G/100ML
INJECTION INTRAVENOUS CONTINUOUS
Status: DISCONTINUED | OUTPATIENT
Start: 2024-12-20 | End: 2024-12-24

## 2024-12-19 RX ORDER — FAMOTIDINE 10 MG/ML
20 INJECTION, SOLUTION INTRAVENOUS ONCE AS NEEDED
Status: DISPENSED | OUTPATIENT
Start: 2024-12-19 | End: 2024-12-19

## 2024-12-19 RX ORDER — ASPIRIN 81 MG/1
0.1 TABLET, CHEWABLE ORAL
Status: CANCELLED | OUTPATIENT
Start: 2024-12-19 | End: 2024-12-19

## 2024-12-19 RX ORDER — DIPHENHYDRAMINE HCL 25 MG
25 CAPSULE ORAL EVERY 6 HOURS
Status: COMPLETED | OUTPATIENT
Start: 2024-12-19 | End: 2024-12-19

## 2024-12-19 RX ORDER — ASPIRIN 81 MG/1
0.3 TABLET, CHEWABLE ORAL
Status: CANCELLED | OUTPATIENT
Start: 2024-12-19 | End: 2024-12-19

## 2024-12-19 NOTE — PLAN OF CARE
Assumed care of patient at 1930. Aox4. RA. NSR. Pt denies pain. Continent of bowel and bladder. Up ad nilda.  Bed locked and in lowest position. Call light and personal items within reach.       POC  Angio 12/19  Ecco 12/19        Problem: Patient/Family Goals  Goal: Patient/Family Long Term Goal  Description: Patient's Long Term Goal: To stay out of the hospital    Interventions:  - Take all medications as prescribed  - Attend all follow up appointments  - See additional Care Plan goals for specific interventions  Outcome: Progressing  Goal: Patient/Family Short Term Goal  Description: Patient's Short Term Goal: To discharge home    Interventions:   - Cath 12/19  - Cardiology to follow up  - See additional Care Plan goals for specific interventions  Outcome: Progressing     Problem: CARDIOVASCULAR - ADULT  Goal: Maintains optimal cardiac output and hemodynamic stability  Description: INTERVENTIONS:  - Monitor vital signs, rhythm, and trends  - Monitor for bleeding, hypotension and signs of decreased cardiac output  - Evaluate effectiveness of vasoactive medications to optimize hemodynamic stability  - Monitor arterial and/or venous puncture sites for bleeding and/or hematoma  - Assess quality of pulses, skin color and temperature  - Assess for signs of decreased coronary artery perfusion - ex. Angina  - Evaluate fluid balance, assess for edema, trend weights  Outcome: Progressing  Goal: Absence of cardiac arrhythmias or at baseline  Description: INTERVENTIONS:  - Continuous cardiac monitoring, monitor vital signs, obtain 12 lead EKG if indicated  - Evaluate effectiveness of antiarrhythmic and heart rate control medications as ordered  - Initiate emergency measures for life threatening arrhythmias  - Monitor electrolytes and administer replacement therapy as ordered  Outcome: Progressing

## 2024-12-19 NOTE — PROGRESS NOTES
Successful \"desensitization\" to asa without event.  Multiple visits/assessments over the past 4 hrs for s/s of allergic reactions- none have occurred.     Plan for [facility] this afternoon.,     Critical care time 90 minutes throughout the morning.    Leslie Mujica NP  12/19/2024  11:50 AM  [phone]

## 2024-12-19 NOTE — PLAN OF CARE
Received pt @ 0730. Pt desensitized to ASA, without any adverse reactions. Pt NPO for scheduled LHC. Pt remains alert and oriented x 4, ambulating with standby assist. Pt denies any pain. NSR on tele. Cath lab called, multiple cases ahead of pt still, as of now still scheduled for tonight. Pt and wife updated on current plan of care, all questions answered.   Problem: CARDIOVASCULAR - ADULT  Goal: Maintains optimal cardiac output and hemodynamic stability  Description: INTERVENTIONS:  - Monitor vital signs, rhythm, and trends  - Monitor for bleeding, hypotension and signs of decreased cardiac output  - Evaluate effectiveness of vasoactive medications to optimize hemodynamic stability  - Monitor arterial and/or venous puncture sites for bleeding and/or hematoma  - Assess quality of pulses, skin color and temperature  - Assess for signs of decreased coronary artery perfusion - ex. Angina  - Evaluate fluid balance, assess for edema, trend weights  12/19/2024 1711 by Yariel Cruz RN  Outcome: Progressing  12/19/2024 1656 by Yariel Cruz RN  Outcome: Progressing  12/19/2024 1656 by Yariel Cruz RN  Outcome: Progressing  Goal: Absence of cardiac arrhythmias or at baseline  Description: INTERVENTIONS:  - Continuous cardiac monitoring, monitor vital signs, obtain 12 lead EKG if indicated  - Evaluate effectiveness of antiarrhythmic and heart rate control medications as ordered  - Initiate emergency measures for life threatening arrhythmias  - Monitor electrolytes and administer replacement therapy as ordered  12/19/2024 1711 by Yariel Cruz RN  Outcome: Progressing  12/19/2024 1656 by Yariel Cruz RN  Outcome: Progressing  12/19/2024 1656 by Yariel Cruz RN  Outcome: Progressing     Problem: PAIN - ADULT  Goal: Verbalizes/displays adequate comfort level or patient's stated pain goal  Description: INTERVENTIONS:  - Encourage pt to monitor pain and request assistance  - Assess pain using appropriate pain scale  -  Administer analgesics based on type and severity of pain and evaluate response  - Implement non-pharmacological measures as appropriate and evaluate response  - Consider cultural and social influences on pain and pain management  - Manage/alleviate anxiety  - Utilize distraction and/or relaxation techniques  - Monitor for opioid side effects  - Notify MD/LIP if interventions unsuccessful or patient reports new pain  - Anticipate increased pain with activity and pre-medicate as appropriate  12/19/2024 1711 by Yariel Cruz, RN  Outcome: Progressing  12/19/2024 1656 by Yariel Cruz, RN  Outcome: Progressing

## 2024-12-19 NOTE — PROGRESS NOTES
[facility]   part of [facility]     Cardiology Progress Note        Drew Ozuna Patient Status:  Inpatient    1975 MRN [id number]   Location [facility] 6NE-A Attending Marcelino Madrid*   Hosp Day # 1 PCP PHYSICIAN NONSTAFF         Subjective/ROS:  No recurrent episodes of chest pain. No sob    Objective:  /85   Pulse 81   Temp 97.1 °F (36.2 °C) (Temporal)   Resp 21   Ht 5' 7.99\" (1.727 m)   Wt 198 lb 6.6 oz (90 kg)   SpO2 97%   BMI 30.18 kg/m²     Temp (24hrs), Av.8 °F (36.6 °C), Min:97.1 °F (36.2 °C), Max:98.1 °F (36.7 °C)      Tele  sr    Intake/Output:  No intake or output data in the 24 hours ending 24 0825    Patient Weight for the past 72 hrs:   Weight   24 0740 205 lb (93 kg)   24 1100 198 lb 6.6 oz (90 kg)   24 1132 198 lb 6.6 oz (90 kg)   24 1213 198 lb 6.6 oz (90 kg)        Physical Exam:    Gen: alert, oriented x 3, NAD  Heent: pupils equal, reactive. Mucous membranes moist.   Neck: no jvd  Cardiac: regular rate and rhythm, normal S1,S2  Lungs: CTA  Abd: soft, NT/ND +bs  Ext: no edema. No scrotal swelling   Skin: Warm, dry  Neuro: No focal deficits    Labs:  Lab Results   Component Value Date    WBC 5.0 2024    HGB 14.5 2024    HCT 41.4 2024    .0 2024    CREATSERUM 0.90 2024    BUN 11 2024     2024    K 3.8 2024     2024    CO2 26.0 2024    GLU 90 2024    CA 9.6 2024    ALB 4.6 2024    ALKPHO 63 2024    BILT 1.1 2024    TP 7.5 2024    AST 31 2024    ALT 40 2024    DDIMER 0.41 2024       CXR: Reviewed. No acute findings    Medications:     diphenhydrAMINE  25 mg Oral q6h    Aspirin  0.1 mg Oral Q20 min    Followed by    Aspirin  0.3 mg Oral Q20 min    Followed by    Aspirin  1 mg Oral Q20 min    Followed by    Aspirin  3 mg Oral Q20 min    Followed by    Aspirin  10 mg Oral Q20 min     Followed by    Aspirin  20 mg Oral Q20 min    Followed by    aspirin  40.5 mg Oral Q20 min    Followed by    aspirin  81 mg Oral Daily    enoxaparin  40 mg Subcutaneous Nightly    sodium chloride   Intravenous On Call      sodium chloride 75 mL/hr at 12/19/24 0453       Assessment:    Chest pain  Trop negative x 3.   EKG without acute changes  Aspirin allergy- plan for desensitization this am      Plan:     Plan for asa desensitization this am. Greatly appreciate the help of pharmacy, RN.  [facility] later today pending clinical course  Add lipid panel to blood in lab  Consider echo- will d/w Dr Stovall.    Discussed plan of care with patient and nursing.       Leslie Mujica, ELIAN  [phone]    I have personally seen and examined patient.   I have independently formulated assessment and plan  I agree with the AP note    No further chest pain.  Undergoing aspirin desensitization.  Aspirin desensitization, discussed in detail with patient.  Cardiac catheterization discussed in detail with patient.  Both patient and wife agreeable.  Plan for cardiac cath later today.  Will follow-up on echocardiogram.    Thank you for the opportunity to participate in the care of your patient.     Aristeo Stovall MD  Interventional Cardiologist  [facility]

## 2024-12-19 NOTE — SPIRITUAL CARE NOTE
Spiritual Care Visit Note    Patient Name: Drew Ozuna Date of Spiritual Care Visit: 24   : 1975 Primary Dx: Acute chest pain       Referred By: Referral From: Nurse    Spiritual Care Taxonomy:    Intended Effects: Aligning care plan with patient's values    Methods: Collaborate with care team member    Interventions: Assist patient with documenting choices    Visit Type/Summary:     - PoA: New PoAH Created: Visited patient in response to PoA for Healthcare consult/request. Created a new PoAH for Healthcare document that, per the patient, accurately reflects their wishes. Gave the patient the original PoAH document along with copies. Confirmed that the PoAH primary agent name and contact information have been entered into the Epic, Advance Directives section. A copy of the new PoAH document has been placed on the patient's paper chart.    Spiritual Care support can be requested via an Epic consult. For urgent/immediate needs, please contact the On Call  at: Edchristine: ext [phone]    Janeth WashingtonDamion   [facility]

## 2024-12-19 NOTE — PLAN OF CARE
Pt transferred from the ED at approx 1130. Alert and oriented x4. Maintains o2 sat on RA. Lung sounds clear. NSR on tele monitor. No complaints of pain. NS running 75 ml/hr. NPO at midnight for Cath on 12/19. Up ad nilda in room. Personal possessions and call light within reach. Bed locked, in lowest position. Updated on plan of care.    Problem: Patient/Family Goals  Goal: Patient/Family Long Term Goal  Description: Patient's Long Term Goal: To stay out of the hospital    Interventions:  - Take all medications as prescribed  - Attend all follow up appointments  - See additional Care Plan goals for specific interventions  Outcome: Not Progressing  Goal: Patient/Family Short Term Goal  Description: Patient's Short Term Goal: To discharge home    Interventions:   - Cath 12/19  - Cardiology to follow up  - See additional Care Plan goals for specific interventions  Outcome: Not Progressing

## 2024-12-19 NOTE — PROGRESS NOTES
[facility]   part of [facility]     Hospitalist Progress Note     Drew Ozuna Patient Status:  Inpatient    1975 MRN [id number]   Location [facility] 6NE-A Attending Marcelino Madrid*   Hosp Day # 1 PCP PHYSICIAN NONSTAFF     Chief Complaint: Chest pain    Subjective:     Patient denies chest pain, transferred to CICU for ASA desensitization.    Objective:    Review of Systems:   A comprehensive review of systems was completed; pertinent positive and negatives stated in subjective.    Vital signs:  Temp:  [97.1 °F (36.2 °C)-98.1 °F (36.7 °C)] 97.6 °F (36.4 °C)  Pulse:  [68-92] 77  Resp:  [16-24] 22  BP: ()/(72-96) 109/96  SpO2:  [92 %-99 %] 94 %    Physical Exam:    General: No acute distress  Respiratory: No wheezes, no rhonchi  Cardiovascular: S1, S2, regular rate and rhythm  Abdomen: Soft, Non-tender, non-distended, positive bowel sounds  Neuro: No new focal deficits.   Extremities: No edema      Diagnostic Data:    Labs:  Recent Labs   Lab 24  0826   WBC 5.0   HGB 14.5   MCV 90.0   .0       Recent Labs   Lab 24  0826 24  0451   GLU 90  --    BUN 11  --    CREATSERUM 0.90  --    CA 9.6  --    ALB 4.6  --      --    K 3.7 3.8     --    CO2 26.0  --    ALKPHO 63  --    AST 31  --    ALT 40  --    BILT 1.1  --    TP 7.5  --        Estimated Creatinine Clearance: 96.1 mL/min (based on SCr of 0.9 mg/dL).    Recent Labs   Lab 24  0826 24  1020   TROPHS <3 <3       No results for input(s): \"PTP\", \"INR\" in the last 168 hours.               Microbiology    No results found for this visit on 24.      Imaging: Reviewed in Epic.    Medications:    diphenhydrAMINE  25 mg Oral q6h    aspirin  81 mg Oral Daily    enoxaparin  40 mg Subcutaneous Nightly    sodium chloride   Intravenous On Call       Assessment & Plan:      #Chest pain  Abnormal stress test  Angiogram today following ASA desensitization  No need for  heparin  Cardiology following  Echo per cards  Monitor on telemetry     #GERD     #Obesity, BMI 30         Keven Farrell MD    Supplementary Documentation:     Quality:  DVT Mechanical Prophylaxis:   SCDs,    DVT Pharmacologic Prophylaxis   Medication    enoxaparin (Lovenox) 40 MG/0.4ML SUBQ injection 40 mg      DVT Pharmacologic prophylaxis: Aspirin 162 mg         Code Status: Not on file  Decker: No urinary catheter in place  Decker Duration (in days):   Central line:    ANDRESSA:     Discharge is dependent on: progress  At this point Mr. Ozuna is expected to be discharge to: home    The 21st Century Cures Act makes medical notes like these available to patients in the interest of transparency. Please be advised this is a medical document. Medical documents are intended to carry relevant information, facts as evident, and the clinical opinion of the practitioner. The medical note is intended as peer to peer communication and may appear blunt or direct. It is written in medical language and may contain abbreviations or verbiage that are unfamiliar.

## 2024-12-19 NOTE — PROGRESS NOTES
12/18/24 1128 12/18/24 1130 12/18/24 1132   Vital Signs   /88 124/86 (!) 119/94   MAP (mmHg) (!) 101 98 (!) 102   BP Location Left arm Left arm Left arm   BP Method Automatic Automatic Automatic   Patient Position Lying Sitting Standing

## 2024-12-20 LAB
APTT PPP: 28.4 SECONDS (ref 23–36)
APTT PPP: 43.8 SECONDS (ref 23–36)
APTT PPP: 52.4 SECONDS (ref 23–36)
BILIRUB UR QL STRIP.AUTO: NEGATIVE
CLARITY UR REFRACT.AUTO: CLEAR
COLOR UR AUTO: COLORLESS
GLUCOSE UR STRIP.AUTO-MCNC: NORMAL MG/DL
KETONES UR STRIP.AUTO-MCNC: NEGATIVE MG/DL
LEUKOCYTE ESTERASE UR QL STRIP.AUTO: NEGATIVE
NITRITE UR QL STRIP.AUTO: NEGATIVE
PH UR STRIP.AUTO: 6 [PH] (ref 5–8)
PLATELET # BLD AUTO: 216 10(3)UL (ref 150–450)
POTASSIUM SERPL-SCNC: 3.9 MMOL/L (ref 3.5–5.1)
PROT UR STRIP.AUTO-MCNC: NEGATIVE MG/DL
RBC UR QL AUTO: NEGATIVE
SP GR UR STRIP.AUTO: <1.005 (ref 1–1.03)
UROBILINOGEN UR STRIP.AUTO-MCNC: NORMAL MG/DL

## 2024-12-20 PROCEDURE — 99233 SBSQ HOSP IP/OBS HIGH 50: CPT | Performed by: HOSPITALIST

## 2024-12-20 RX ORDER — HEPARIN SODIUM 1000 [USP'U]/ML
30 INJECTION, SOLUTION INTRAVENOUS; SUBCUTANEOUS ONCE
Status: DISCONTINUED | OUTPATIENT
Start: 2024-12-20 | End: 2024-12-20

## 2024-12-20 NOTE — PROCEDURES
[facility]   part of [facility]    Cardiac Cath Procedure Note  Drew Ozuna Patient Status:  Inpatient    1975 MRN [id number]   [facility] INTERVENTIONAL SUITES Attending Marcelino Madrid*   Hosp Day # 1 PCP PHYSICIAN NONSTAFF       Cardiologist: Aristeo Stovall MD  Primary Proceduralist: Aristeo Stovall MD  Procedure Performed: [facility]  Date of Procedure: 2024   Indication: 49 year old man with exertional chest pain. Patient with history of ASA allergy who underwent ASA desensitization earlier today    Consent:   Informed written consent was obtained from the patient after risk benefits and alternative explained the patient.  Patient agreed to proceed    Sedation  IV conscious sedation was achieved with Versed and fentanyl.  It was administered under my direct supervision.  Hemodynamic monitoring took place throughout the entire procedure by myself in the Cath Lab staff.    Description of the procedure: After written informed consent was obtained from the patient, patient was brought to the cardiac catheterization laboratory in a stable condition and fasting state.  Patient was prepped and draped in the usual sterile fashion.  IV conscious sedation was achieved with Versed and fentanyl.  Lidocaine 1% was used to infiltrate the right radial artery for local anesthesia and a 6 Cypriot introducer sheath was inserted into the right radial artery.      Specimen sent to: No specimen collected  Estimated blood loss: 10 cc  Closure:  TR band    Left Ventriculography and hemodynamics:   LV EDP 18 mmHg  No gradient across aortic valve    Coronary Angiography  RCA:  Large, dominant, Mild CAD. There are robust right to left collaterals to the LAD and diagonal.   Left main:  Large, minimal luminal irregularities  Left anterior descending:  Large sized vessel. There is a subtotal/ of the proximal LAD with antegrade bridge collaterals.   Circumflex:  Large, 60% stenosis of  large sized OM1    Assessment:  MV CAD involving /subtotal occlusion of the proximal LAD  ASA hypersensitivity      Recommendations:  Recommend cardiothoracic surgery evaluation for CABG  Routine post cath care  Finding of cardiac catheterization and further management was discussed in detail with patient    Specimen sent to: No specimen collected  Estimated blood loss: 10 cc  Closure:  TR ana rosa Stovall MD  12/19/24

## 2024-12-20 NOTE — PLAN OF CARE
Assumed care of pt this PM. Pt is AOx4 and in NSR. Pt had LHC today. Pt came out with R radial TR band removed with no complications. Heparin drip started per ACS/A-fib protocol. Pt ambulates and voids well. Pt denies any pain.  Plan of care reviewed and all questions answered. Family at bedside.

## 2024-12-20 NOTE — PROGRESS NOTES
Progress Note  Drew Ozuna Patient Status:  Inpatient    1975 MRN [id number]   Location [facility] 6NE-A Attending Marcelino Madrid*   Hosp Day # 2 PCP PHYSICIAN NONSTAFF     Subjective:  No complaints  No chest pain or pressure  Wife at bedside    Objective:  BP (!) 122/92 (BP Location: Left arm)   Pulse 81   Temp 98.5 °F (36.9 °C) (Temporal)   Resp 22   Ht 5' 7.99\" (1.727 m)   Wt 197 lb 5 oz (89.5 kg)   SpO2 95%   BMI 30.01 kg/m²     Telemetry: NSR      Intake/Output:    Intake/Output Summary (Last 24 hours) at 2024 1657  Last data filed at 2024 1600  Gross per 24 hour   Intake 988 ml   Output 575 ml   Net 413 ml       Last 3 Weights   24 0000 197 lb 5 oz (89.5 kg)   24 1213 198 lb 6.6 oz (90 kg)   24 1132 198 lb 6.6 oz (90 kg)   24 1100 198 lb 6.6 oz (90 kg)   24 0740 205 lb (93 kg)   24 1706 205 lb (93 kg)   10/14/23 1129 210 lb (95.3 kg)       Labs:  Recent Labs   Lab 24  0824  0451 24  0726   GLU 90  --  111*  --    BUN 11  --  11  --    CREATSERUM 0.90  --  0.86  --    EGFRCR 105  --  106  --    CA 9.6  --  9.2  --      --  138  --    K 3.7 3.8 3.8 3.9     --  106  --    CO2 26.0  --  26.0  --      Recent Labs   Lab 24  0824  0726   RBC 4.60 4.32  --    HGB 14.5 13.8  --    HCT 41.4 39.3  --    MCV 90.0 91.0  --    MCH 31.5 31.9  --    MCHC 35.0 35.1  --    RDW 11.9 11.7  --    NEPRELIM 3.28  --   --    WBC 5.0 5.6  --    .0 217.0 216.0         Recent Labs   Lab 24  0826 24  1020   TROPHS <3 <3       Diagnostics:             Physical Exam:    Physical Exam  Vitals and nursing note reviewed.   Constitutional:       General: He is not in acute distress.     Appearance: Normal appearance. He is not ill-appearing or diaphoretic.   HENT:      Head: Normocephalic and atraumatic.   Eyes:      Extraocular Movements: Extraocular movements  intact.   Cardiovascular:      Rate and Rhythm: Normal rate and regular rhythm.      Heart sounds: No murmur heard.     No gallop.   Pulmonary:      Effort: No respiratory distress.      Breath sounds: Normal breath sounds.   Abdominal:      General: There is no distension.      Palpations: Abdomen is soft.   Musculoskeletal:         General: No swelling. Normal range of motion.   Skin:     General: Skin is warm and dry.      Capillary Refill: Capillary refill takes less than 2 seconds.      Coloration: Skin is not pale.   Neurological:      General: No focal deficit present.      Mental Status: He is alert and oriented to person, place, and time.      Cranial Nerves: No cranial nerve deficit.   Psychiatric:         Mood and Affect: Mood normal.         Medications:   aspirin  81 mg Oral Daily      continuous dose heparin 1,150 Units/hr (12/20/24 1600)    sodium chloride Stopped (12/19/24 2000)       Assessment/Plan:     MV CAD  ASA hypersensitivity - sp desensitization    Cardiac cath discussed in detail with patient  Patient seen and examined by CT surgery. Discussed with Dr Rausch. Tentative plan for CABG on Tuesday  Will cont to follow          Aristeo Stovall MD  12/20/2024  4:57 PM

## 2024-12-20 NOTE — PROGRESS NOTES
[facility]   part of [facility]     Hospitalist Progress Note     Drew Ozuna Patient Status:  Inpatient    1975 MRN [id number]   Location [facility] 6NE-A Attending Marcelino Madrid*   Hosp Day # 2 PCP PHYSICIAN NONSTAFF     Chief Complaint: Chest pain    Subjective:     Patient had LHC yesterday, no chest pain today.    Objective:    Review of Systems:   A comprehensive review of systems was completed; pertinent positive and negatives stated in subjective.    Vital signs:  Temp:  [97.7 °F (36.5 °C)-98.3 °F (36.8 °C)] 98.3 °F (36.8 °C)  Pulse:  [64-89] 89  Resp:  [13-25] 14  BP: ()/() 105/79  SpO2:  [91 %-97 %] 96 %    Physical Exam:    General: No acute distress  Respiratory: No wheezes, no rhonchi  Cardiovascular: S1, S2, regular rate and rhythm  Abdomen: Soft, Non-tender, non-distended, positive bowel sounds  Neuro: No new focal deficits.   Extremities: No edema      Diagnostic Data:    Labs:  Recent Labs   Lab 24  0826 24  2324 24  0726   WBC 5.0 5.6  --    HGB 14.5 13.8  --    MCV 90.0 91.0  --    .0 217.0 216.0       Recent Labs   Lab 24  0826 24  0451 24  2324 24  0726   GLU 90  --  111*  --    BUN 11  --  11  --    CREATSERUM 0.90  --  0.86  --    CA 9.6  --  9.2  --    ALB 4.6  --   --   --      --  138  --    K 3.7 3.8 3.8 3.9     --  106  --    CO2 26.0  --  26.0  --    ALKPHO 63  --   --   --    AST 31  --   --   --    ALT 40  --   --   --    BILT 1.1  --   --   --    TP 7.5  --   --   --        Estimated Creatinine Clearance: 100.5 mL/min (based on SCr of 0.86 mg/dL).    Recent Labs   Lab 24  0826 24  1020   TROPHS <3 <3       No results for input(s): \"PTP\", \"INR\" in the last 168 hours.               Microbiology    No results found for this visit on 24.      Imaging: Reviewed in Epic.    Medications:    aspirin  81 mg Oral Daily       Assessment & Plan:      #Chest  pain  Abnormal stress test  LHC yesterday following ASA desensitization, MV CAD involving /subtotal occlusion of the proximal LAD  Cardiology following  Echo reviewed, EF 60-65%  CV surgery to eval  Monitor on telemetry     #GERD     #Obesity, BMI 30         Keven Farrell MD    Supplementary Documentation:     Quality:  DVT Mechanical Prophylaxis:   SCDs,    DVT Pharmacologic Prophylaxis   Medication    heparin (Porcine) 19148 units/250mL infusion ACS/AFIB CONTINUOUS         DVT Pharmacologic prophylaxis: Aspirin 81 mg      Code Status: Not on file  Decker: No urinary catheter in place  Decker Duration (in days):   Central line:    ANDRESSA:     Discharge is dependent on: progress  At this point Mr. Ozuna is expected to be discharge to: home    The 21st Century Cures Act makes medical notes like these available to patients in the interest of transparency. Please be advised this is a medical document. Medical documents are intended to carry relevant information, facts as evident, and the clinical opinion of the practitioner. The medical note is intended as peer to peer communication and may appear blunt or direct. It is written in medical language and may contain abbreviations or verbiage that are unfamiliar.

## 2024-12-21 LAB
APTT PPP: 56.6 SECONDS (ref 23–36)
GLUCOSE BLD-MCNC: 98 MG/DL (ref 70–99)
PLATELET # BLD AUTO: 229 10(3)UL (ref 150–450)

## 2024-12-21 PROCEDURE — 99232 SBSQ HOSP IP/OBS MODERATE 35: CPT | Performed by: HOSPITALIST

## 2024-12-21 RX ORDER — ALPRAZOLAM 0.25 MG/1
0.25 TABLET ORAL 3 TIMES DAILY PRN
Status: DISCONTINUED | OUTPATIENT
Start: 2024-12-21 | End: 2024-12-27

## 2024-12-21 RX ORDER — DOCUSATE SODIUM 100 MG/1
100 CAPSULE, LIQUID FILLED ORAL 2 TIMES DAILY
Status: DISCONTINUED | OUTPATIENT
Start: 2024-12-21 | End: 2024-12-24

## 2024-12-21 NOTE — PLAN OF CARE
Received pt @ 0730. Pt remains alert and oriented x4, ambulating with standby assistance. Right radial procedural site remains clean/dry/intact, soft, with no hematoma. Pt remains on heparin gtt, aPTT therapeutic. Pt transitioned to room air, incentive spirometry at bedside. Pt discussed CABG procedure with Dr. Massey and Dr. Rausch, awaiting date for procedure. Cardiac surgery educational binder given to pt and wife. Pt and wife updated on current plan of care, all questions answered.   Problem: CARDIOVASCULAR - ADULT  Goal: Maintains optimal cardiac output and hemodynamic stability  Description: INTERVENTIONS:  - Monitor vital signs, rhythm, and trends  - Monitor for bleeding, hypotension and signs of decreased cardiac output  - Evaluate effectiveness of vasoactive medications to optimize hemodynamic stability  - Monitor arterial and/or venous puncture sites for bleeding and/or hematoma  - Assess quality of pulses, skin color and temperature  - Assess for signs of decreased coronary artery perfusion - ex. Angina  - Evaluate fluid balance, assess for edema, trend weights  Outcome: Progressing  Goal: Absence of cardiac arrhythmias or at baseline  Description: INTERVENTIONS:  - Continuous cardiac monitoring, monitor vital signs, obtain 12 lead EKG if indicated  - Evaluate effectiveness of antiarrhythmic and heart rate control medications as ordered  - Initiate emergency measures for life threatening arrhythmias  - Monitor electrolytes and administer replacement therapy as ordered  Outcome: Progressing     Problem: PAIN - ADULT  Goal: Verbalizes/displays adequate comfort level or patient's stated pain goal  Description: INTERVENTIONS:  - Encourage pt to monitor pain and request assistance  - Assess pain using appropriate pain scale  - Administer analgesics based on type and severity of pain and evaluate response  - Implement non-pharmacological measures as appropriate and evaluate response  - Consider cultural and  social influences on pain and pain management  - Manage/alleviate anxiety  - Utilize distraction and/or relaxation techniques  - Monitor for opioid side effects  - Notify MD/LIP if interventions unsuccessful or patient reports new pain  - Anticipate increased pain with activity and pre-medicate as appropriate  Outcome: Progressing

## 2024-12-21 NOTE — PROGRESS NOTES
Progress Note  Drew Ozuna Patient Status:  Inpatient    1975 MRN [id number]   Location [facility] 6NE-A Attending Marcelino Madrid*   Hosp Day # 3 PCP PHYSICIAN NONSTAFF     Subjective:  No acute overnight issues. Trouble sleeping at night. Denies CP, SOB, syncope.    Objective:  /76   Pulse 73   Temp 98.4 °F (36.9 °C) (Temporal)   Resp 15   Ht 5' 7.99\" (1.727 m)   Wt 198 lb (89.8 kg)   SpO2 94%   BMI 30.11 kg/m²         Intake/Output:    Intake/Output Summary (Last 24 hours) at 2024 07  Last data filed at 2024 0000  Gross per 24 hour   Intake 1222.2 ml   Output 450 ml   Net 772.2 ml       Last 3 Weights   24 0000 198 lb (89.8 kg)   24 0000 197 lb 5 oz (89.5 kg)   24 1213 198 lb 6.6 oz (90 kg)   24 1132 198 lb 6.6 oz (90 kg)   24 1100 198 lb 6.6 oz (90 kg)   24 0740 205 lb (93 kg)   24 1706 205 lb (93 kg)   10/14/23 1129 210 lb (95.3 kg)       Labs:  Recent Labs   Lab 24  0826 24  0451 244 24  0726   GLU 90  --  111*  --    BUN 11  --  11  --    CREATSERUM 0.90  --  0.86  --    EGFRCR 105  --  106  --    CA 9.6  --  9.2  --      --  138  --    K 3.7 3.8 3.8 3.9     --  106  --    CO2 26.0  --  26.0  --      Recent Labs   Lab 24  0826 24  2324 24  0726 24  0529   RBC 4.60 4.32  --   --    HGB 14.5 13.8  --   --    HCT 41.4 39.3  --   --    MCV 90.0 91.0  --   --    MCH 31.5 31.9  --   --    MCHC 35.0 35.1  --   --    RDW 11.9 11.7  --   --    NEPRELIM 3.28  --   --   --    WBC 5.0 5.6  --   --    .0 217.0 216.0 229.0         Recent Labs   Lab 24  0826 24  1020   TROPHS <3 <3       Diagnostics:             Physical Exam:    Physical Exam  Vitals and nursing note reviewed.   Constitutional:       General: He is not in acute distress.     Appearance: Normal appearance. He is not ill-appearing or diaphoretic.   HENT:      Head:  Normocephalic and atraumatic.   Eyes:      Extraocular Movements: Extraocular movements intact.   Cardiovascular:      Rate and Rhythm: Normal rate and regular rhythm.      Heart sounds: No murmur heard.     No gallop.   Pulmonary:      Effort: No respiratory distress.      Breath sounds: Normal breath sounds.   Abdominal:      General: There is no distension.      Palpations: Abdomen is soft.   Musculoskeletal:         General: No swelling. Normal range of motion.   Skin:     General: Skin is warm and dry.      Capillary Refill: Capillary refill takes less than 2 seconds.      Coloration: Skin is not pale.   Neurological:      General: No focal deficit present.      Mental Status: He is alert and oriented to person, place, and time.      Cranial Nerves: No cranial nerve deficit.   Psychiatric:         Mood and Affect: Mood normal.         Medications:   aspirin  81 mg Oral Daily      continuous dose heparin 1,150 Units/hr (12/21/24 0400)    sodium chloride Stopped (12/19/24 2000)       Assessment/Plan:     MV CAD  ASA hypersensitivity - sp desensitization    Patient seen and examined by CT surgery. Discussed with Dr Rausch. Tentative plan for CABG on Tuesday  Will cont to follow  - supportive care, aspirin, BB,  heparin gtt.turn off as per CTS recommendations prior to CABG.  - continue to monitor in CCU until then.    Eliecer Currie MD   Advanced Heart Failure and Transplant Cardiology   [facility] ([facility])     L3

## 2024-12-21 NOTE — PLAN OF CARE
Assumed patient care at 0730. Patient resting in bed, alert/oriented. VSS. Heparin gtt infusing per ACS protocol. Tolerating diet. Able to void. Denies pain. Ambulates with standby assist. Transfer orders, received room 2604, report given to Irma SANDOVAL. Patient belongings sent with.

## 2024-12-21 NOTE — PROGRESS NOTES
No acute events overnight. Vitals signs stable, denies any CP, SOB. Surgical options discussed at length with patient by Dr Massey. Patient and wife will discuss their options and Dr Massey will check back in this afternoon with nursing. Ok to transfer to telemetry per Dr Massey.

## 2024-12-21 NOTE — PROGRESS NOTES
[facility]   part of [facility]     Hospitalist Progress Note     Drew Ozuna Patient Status:  Inpatient    1975 MRN [id number]   Location [facility] 6NE-A Attending Marcelino Madrid*   Hosp Day # 3 PCP PHYSICIAN NONSTAFF     Chief Complaint: Chest pain    Subjective:     Patient doing well overall, no chest pain.    Objective:    Review of Systems:   A comprehensive review of systems was completed; pertinent positive and negatives stated in subjective.    Vital signs:  Temp:  [97.6 °F (36.4 °C)-98.6 °F (37 °C)] 97.6 °F (36.4 °C)  Pulse:  [67-89] 89  Resp:  [13-24] 19  BP: ()/(63-93) 125/85  SpO2:  [92 %-96 %] 96 %    Physical Exam:    General: No acute distress  Respiratory: No wheezes, no rhonchi  Cardiovascular: S1, S2, regular rate and rhythm  Abdomen: Soft, Non-tender, non-distended, positive bowel sounds  Neuro: No new focal deficits.   Extremities: No edema      Diagnostic Data:    Labs:  Recent Labs   Lab 24  0824  0726 24  0529   WBC 5.0 5.6  --   --    HGB 14.5 13.8  --   --    MCV 90.0 91.0  --   --    .0 217.0 216.0 229.0       Recent Labs   Lab 24  0826 24  0451 24  2324 24  0726   GLU 90  --  111*  --    BUN 11  --  11  --    CREATSERUM 0.90  --  0.86  --    CA 9.6  --  9.2  --    ALB 4.6  --   --   --      --  138  --    K 3.7 3.8 3.8 3.9     --  106  --    CO2 26.0  --  26.0  --    ALKPHO 63  --   --   --    AST 31  --   --   --    ALT 40  --   --   --    BILT 1.1  --   --   --    TP 7.5  --   --   --        Estimated Creatinine Clearance: 100.5 mL/min (based on SCr of 0.86 mg/dL).    Recent Labs   Lab 24  0826 24  1020   TROPHS <3 <3       No results for input(s): \"PTP\", \"INR\" in the last 168 hours.               Microbiology    No results found for this visit on 24.      Imaging: Reviewed in Epic.    Medications:    aspirin  81 mg Oral Daily       Assessment  & Plan:      #Chest pain  Abnormal stress test  S/p LHC following ASA desensitization, MV CAD involving /subtotal occlusion of the proximal LAD  Heparin gtt  Cardiology following  Echo reviewed, EF 60-65%  CV surgery following, plan for CABG Mon/Tues  Monitor on telemetry     #GERD     #Obesity, BMI 30         Keven Farrell MD    Supplementary Documentation:     Quality:  DVT Mechanical Prophylaxis:   SCDs,    DVT Pharmacologic Prophylaxis   Medication    heparin (Porcine) 22220 units/250mL infusion ACS/AFIB CONTINUOUS         DVT Pharmacologic prophylaxis: Aspirin 81 mg      Code Status: Not on file  Decker: No urinary catheter in place  Decker Duration (in days):   Central line:    ANDRESSA:     Discharge is dependent on: progress  At this point Mr. Ozuna is expected to be discharge to: home    The 21st Century Cures Act makes medical notes like these available to patients in the interest of transparency. Please be advised this is a medical document. Medical documents are intended to carry relevant information, facts as evident, and the clinical opinion of the practitioner. The medical note is intended as peer to peer communication and may appear blunt or direct. It is written in medical language and may contain abbreviations or verbiage that are unfamiliar.

## 2024-12-21 NOTE — PROGRESS NOTES
Care assumed 1930 in bed, Heparin per ACS protocol, therapeutic. Wife at bedside, questions answered. No chest pain, VSS, normotensive, NSR. Ambulates easily.

## 2024-12-22 LAB
ANION GAP SERPL CALC-SCNC: 7 MMOL/L (ref 0–18)
APTT PPP: 59.1 SECONDS (ref 23–36)
BUN BLD-MCNC: 12 MG/DL (ref 9–23)
CALCIUM BLD-MCNC: 9.4 MG/DL (ref 8.7–10.4)
CHLORIDE SERPL-SCNC: 107 MMOL/L (ref 98–112)
CO2 SERPL-SCNC: 26 MMOL/L (ref 21–32)
CREAT BLD-MCNC: 0.92 MG/DL
EGFRCR SERPLBLD CKD-EPI 2021: 102 ML/MIN/1.73M2 (ref 60–?)
GLUCOSE BLD-MCNC: 89 MG/DL (ref 70–99)
OSMOLALITY SERPL CALC.SUM OF ELEC: 289 MOSM/KG (ref 275–295)
PLATELET # BLD AUTO: 235 10(3)UL (ref 150–450)
POTASSIUM SERPL-SCNC: 3.8 MMOL/L (ref 3.5–5.1)
SODIUM SERPL-SCNC: 140 MMOL/L (ref 136–145)

## 2024-12-22 PROCEDURE — 99232 SBSQ HOSP IP/OBS MODERATE 35: CPT | Performed by: HOSPITALIST

## 2024-12-22 RX ORDER — ATORVASTATIN CALCIUM 80 MG/1
80 TABLET, FILM COATED ORAL NIGHTLY
Status: DISCONTINUED | OUTPATIENT
Start: 2024-12-22 | End: 2024-12-27

## 2024-12-22 NOTE — PROGRESS NOTES
Per RN, pt's family concerned about SBP 130s-140s. Per cardiology rounding note from earlier today, beta blocker to be started.

## 2024-12-22 NOTE — PROGRESS NOTES
[facility]   part of [facility]     Hospitalist Progress Note     Drew Ozuna Patient Status:  Inpatient    1975 MRN [id number]   Location [facility] 6NE-A Attending Marcelino Madrid*   Hosp Day # 4 PCP PHYSICIAN NONSTAFF     Chief Complaint: Chest pain    Subjective:     Patient doing well overall, no chest pain.    Objective:    Review of Systems:   A comprehensive review of systems was completed; pertinent positive and negatives stated in subjective.    Vital signs:  Temp:  [97.6 °F (36.4 °C)-98.2 °F (36.8 °C)] 98 °F (36.7 °C)  Pulse:  [67-90] 67  Resp:  [16-22] 16  BP: ()/(70-99) 97/70  SpO2:  [93 %-98 %] 93 %    Physical Exam:    General: No acute distress  Respiratory: No wheezes, no rhonchi  Cardiovascular: S1, S2, regular rate and rhythm  Abdomen: Soft, Non-tender, non-distended, positive bowel sounds  Neuro: No new focal deficits.   Extremities: No edema      Diagnostic Data:    Labs:  Recent Labs   Lab 24  0826 24  2324 24  0726 24  0529 24  0618   WBC 5.0 5.6  --   --   --    HGB 14.5 13.8  --   --   --    MCV 90.0 91.0  --   --   --    .0 217.0 216.0 229.0 235.0       Recent Labs   Lab 24  0826 24  0451 24  2324 24  0726   GLU 90  --  111*  --    BUN 11  --  11  --    CREATSERUM 0.90  --  0.86  --    CA 9.6  --  9.2  --    ALB 4.6  --   --   --      --  138  --    K 3.7 3.8 3.8 3.9     --  106  --    CO2 26.0  --  26.0  --    ALKPHO 63  --   --   --    AST 31  --   --   --    ALT 40  --   --   --    BILT 1.1  --   --   --    TP 7.5  --   --   --        Estimated Creatinine Clearance: 100.5 mL/min (based on SCr of 0.86 mg/dL).    Recent Labs   Lab 24  0826 24  1020   TROPHS <3 <3       No results for input(s): \"PTP\", \"INR\" in the last 168 hours.               Microbiology    No results found for this visit on 24.      Imaging: Reviewed in Epic.    Medications:     atorvastatin  80 mg Oral Nightly    docusate sodium  100 mg Oral BID    metoprolol tartrate  12.5 mg Oral 2x Daily(Beta Blocker)    aspirin  81 mg Oral Daily       Assessment & Plan:      #Chest pain  Abnormal stress test  S/p LHC following ASA desensitization, MV CAD involving /subtotal occlusion of the proximal LAD  Heparin gtt  Cardiology following  Echo reviewed, EF 60-65%  CV surgery following, plan for CABG Tues  Monitor on telemetry     #GERD     #Obesity, BMI 30         Keven Farrell MD    Supplementary Documentation:     Quality:  DVT Mechanical Prophylaxis:   SCDs,    DVT Pharmacologic Prophylaxis   Medication    heparin (Porcine) 29599 units/250mL infusion ACS/AFIB CONTINUOUS         DVT Pharmacologic prophylaxis: Aspirin 81 mg      Code Status: Not on file  Decker: No urinary catheter in place  Decker Duration (in days):   Central line:    ANDRESSA:     Discharge is dependent on: progress  At this point Mr. Ozuna is expected to be discharge to: home    The 21st Century Cures Act makes medical notes like these available to patients in the interest of transparency. Please be advised this is a medical document. Medical documents are intended to carry relevant information, facts as evident, and the clinical opinion of the practitioner. The medical note is intended as peer to peer communication and may appear blunt or direct. It is written in medical language and may contain abbreviations or verbiage that are unfamiliar.

## 2024-12-22 NOTE — PROGRESS NOTES
Patient arrived to unit around 1830. Received report from Mary SANDOVAL. Skin assessment performed with Laila Staff. Intact. Alert and oriented x4. On Ra. SpO2 within normal limits. NSR on tele. Heparin gtt. Continent of bowel and bladder. Plan for CABG next week. Call light within reach. Continue plan of care.     1959: Spouse expressing concern about slight elevated blood pressure. 144/94. Stating \"this is the highest his heart has been since being here and I don't want his heart to be effected by this.\" Per pt did start to feel flushed. Pt not sure if after seeing BP or before. Blood sugar checked. On call RUBY PEACOCK notified. Hospitalist on call, placed PRN xanax for anxiety.

## 2024-12-22 NOTE — PROGRESS NOTES
Progress Note  Drew Ozuna Patient Status:  Inpatient    1975 MRN [id number]   Location [facility] 2NE-A Attending Marcelino Madrid*   Hosp Day # 4 PCP PHYSICIAN NONSTAFF     Subjective:  Patient states yesterday afternoon he had an episode of chest pain that has subsided. During this time frame he states that his blood pressure increased to 140/90.     Objective:  /76 (BP Location: Left arm)   Pulse 73   Temp 97.9 °F (36.6 °C) (Oral)   Resp 16   Ht 5' 7.99\" (1.727 m)   Wt 195 lb 15.8 oz (88.9 kg)   SpO2 95%   BMI 29.81 kg/m²     Telemetry: SR      Intake/Output: +175    Intake/Output Summary (Last 24 hours) at 2024 0635  Last data filed at 2024 0421  Gross per 24 hour   Intake 615.43 ml   Output 1325 ml   Net -709.57 ml       Last 3 Weights   24 0421 195 lb 15.8 oz (88.9 kg)   24 0000 198 lb (89.8 kg)   24 0000 197 lb 5 oz (89.5 kg)   24 1213 198 lb 6.6 oz (90 kg)   24 1132 198 lb 6.6 oz (90 kg)   24 1100 198 lb 6.6 oz (90 kg)   24 0740 205 lb (93 kg)   24 1706 205 lb (93 kg)   10/14/23 1129 210 lb (95.3 kg)       Labs:  Recent Labs   Lab 24  0826 24  0451 24  2324 24  0726   GLU 90  --  111*  --    BUN 11  --  11  --    CREATSERUM 0.90  --  0.86  --    EGFRCR 105  --  106  --    CA 9.6  --  9.2  --    ALB 4.6  --   --   --      --  138  --    K 3.7 3.8 3.8 3.9     --  106  --    CO2 26.0  --  26.0  --    ALKPHO 63  --   --   --    AST 31  --   --   --    ALT 40  --   --   --    BILT 1.1  --   --   --    TP 7.5  --   --   --      Recent Labs   Lab 24  0824  2324 24  0726 24  0529   RBC 4.60 4.32  --   --    HGB 14.5 13.8  --   --    HCT 41.4 39.3  --   --    MCV 90.0 91.0  --   --    MCH 31.5 31.9  --   --    MCHC 35.0 35.1  --   --    RDW 11.9 11.7  --   --    NEPRELIM 3.28  --   --   --    WBC 5.0 5.6  --   --    .0 217.0 216.0 229.0          Recent Labs   Lab 12/18/24  0826 12/18/24  1020   TROPHS <3 <3     Lab Results   Component Value Date    INR 0.99 12/05/2024       Diagnostics:     Review of Systems   Constitutional: Negative.   Cardiovascular: Negative.    Respiratory: Negative.         Physical Exam:    Gen: Alert, oriented x 3, in no apparent distress  Heent: Pupils equal, reactive. Mucous membranes moist.   Cardiac: Regular rate and rhythm, normal S1,S2  Lungs: Clear to auscultation  Abd: Soft, non tender, non distended  Ext: No edema  Skin: Warm, dry  Neuro: No focal deficits  Right wrist , soft no hematoma      Medications:     docusate sodium  100 mg Oral BID    metoprolol tartrate  12.5 mg Oral 2x Daily(Beta Blocker)    aspirin  81 mg Oral Daily      continuous dose heparin 1,150 Units/hr (12/21/24 1937)    sodium chloride Stopped (12/19/24 2000)           Assessment:  Chest pain, abnormal stress test  S/p LHC 12/19/24 with MV CAD  On ASA, BB  Echo with normal LVEF 60-65%, no RWMA  On IV heparin  ASA hypersensitivity, s/p desensitization  Hyperlipidemia -     Plan:  Continue ASA, BB, start statin ( [facility] 80 mg po at bedtime)  Tentative plans for CABG Tuesday per [facility]    Plan of care discussed with patient, RN.    AYUSH Kenny  12/22/2024  6:35 AM    Patient seen and examined independently.  Note reviewed and labs reviewed.  Agree with above assessment and plan with the below assessment and plan adjustments:    Still anxious about upcoming CABG. Denies acute CP, SOB.      MV CAD  ASA hypersensitivity - sp desensitization     Tentative plan for CABG on Tuesday  Will cont to follow  - supportive care, aspirin, BB,  heparin gtt.turn off as per CTS recommendations prior to CABG.    Eliecer Currie MD   Advanced Heart Failure and Transplant Cardiology   [facility]     L3

## 2024-12-22 NOTE — PLAN OF CARE
Acquired patient around 0730. Alert and oriented x4. On Ra. SpO2 within normal limits. Pt denies sob or cp at this time. NSR on tele. Heparin gtt. Plan for CABG this week, tentative for Tuesday. Continent of bowel and bladder. Call light within reach. Continue plan of care.    Problem: Patient/Family Goals  Goal: Patient/Family Long Term Goal  Description: Patient's Long Term Goal: To stay out of the hospital    Interventions:  - Take all medications as prescribed  - Attend all follow up appointments  - See additional Care Plan goals for specific interventions  Outcome: Progressing  Goal: Patient/Family Short Term Goal  Description: Patient's Short Term Goal: To discharge home    Interventions:   - Cath 12/19  - Cardiology to follow up  - See additional Care Plan goals for specific interventions  Outcome: Progressing     Problem: CARDIOVASCULAR - ADULT  Goal: Maintains optimal cardiac output and hemodynamic stability  Description: INTERVENTIONS:  - Monitor vital signs, rhythm, and trends  - Monitor for bleeding, hypotension and signs of decreased cardiac output  - Evaluate effectiveness of vasoactive medications to optimize hemodynamic stability  - Monitor arterial and/or venous puncture sites for bleeding and/or hematoma  - Assess quality of pulses, skin color and temperature  - Assess for signs of decreased coronary artery perfusion - ex. Angina  - Evaluate fluid balance, assess for edema, trend weights  Outcome: Progressing  Goal: Absence of cardiac arrhythmias or at baseline  Description: INTERVENTIONS:  - Continuous cardiac monitoring, monitor vital signs, obtain 12 lead EKG if indicated  - Evaluate effectiveness of antiarrhythmic and heart rate control medications as ordered  - Initiate emergency measures for life threatening arrhythmias  - Monitor electrolytes and administer replacement therapy as ordered  Outcome: Progressing     Problem: PAIN - ADULT  Goal: Verbalizes/displays adequate comfort level or  patient's stated pain goal  Description: INTERVENTIONS:  - Encourage pt to monitor pain and request assistance  - Assess pain using appropriate pain scale  - Administer analgesics based on type and severity of pain and evaluate response  - Implement non-pharmacological measures as appropriate and evaluate response  - Consider cultural and social influences on pain and pain management  - Manage/alleviate anxiety  - Utilize distraction and/or relaxation techniques  - Monitor for opioid side effects  - Notify MD/LIP if interventions unsuccessful or patient reports new pain  - Anticipate increased pain with activity and pre-medicate as appropriate  Outcome: Progressing

## 2024-12-22 NOTE — PLAN OF CARE
Pt received at 1930. A&Ox4. Room air. NSR on telemetry monitor, IV heparin gtt infusing per orders. Cardiology notified of Bps becoming slightly elevated and pt c/o feeling flushed at start of shift and po metoprolol given per orders with improvement and BPs remaining WDL. Denies CP. Continent b&b. Denies pain. Ambulating with standby assist. Pt and pt's spouse at bedside updated and agree with plan of care. Frequent rounding and fall precautions in place.     Problem: Patient/Family Goals  Goal: Patient/Family Long Term Goal  Description: Patient's Long Term Goal: To stay out of the hospital    Interventions:  - Take all medications as prescribed  - Attend all follow up appointments  - See additional Care Plan goals for specific interventions  Outcome: Progressing  Goal: Patient/Family Short Term Goal  Description: Patient's Short Term Goal: To discharge home    Interventions:   - Cath 12/19  - Cardiology to follow up  - See additional Care Plan goals for specific interventions  Outcome: Progressing     Problem: CARDIOVASCULAR - ADULT  Goal: Maintains optimal cardiac output and hemodynamic stability  Description: INTERVENTIONS:  - Monitor vital signs, rhythm, and trends  - Monitor for bleeding, hypotension and signs of decreased cardiac output  - Evaluate effectiveness of vasoactive medications to optimize hemodynamic stability  - Monitor arterial and/or venous puncture sites for bleeding and/or hematoma  - Assess quality of pulses, skin color and temperature  - Assess for signs of decreased coronary artery perfusion - ex. Angina  - Evaluate fluid balance, assess for edema, trend weights  Outcome: Progressing  Goal: Absence of cardiac arrhythmias or at baseline  Description: INTERVENTIONS:  - Continuous cardiac monitoring, monitor vital signs, obtain 12 lead EKG if indicated  - Evaluate effectiveness of antiarrhythmic and heart rate control medications as ordered  - Initiate emergency measures for life threatening  arrhythmias  - Monitor electrolytes and administer replacement therapy as ordered  Outcome: Progressing

## 2024-12-23 ENCOUNTER — ANESTHESIA EVENT (OUTPATIENT)
Dept: CARDIAC SURGERY | Facility: HOSPITAL | Age: 49
End: 2024-12-23
Payer: OTHER GOVERNMENT

## 2024-12-23 LAB
ANION GAP SERPL CALC-SCNC: 7 MMOL/L (ref 0–18)
ANTIBODY SCREEN: NEGATIVE
APTT PPP: 58.9 SECONDS (ref 23–36)
BUN BLD-MCNC: 12 MG/DL (ref 9–23)
CALCIUM BLD-MCNC: 9.6 MG/DL (ref 8.7–10.4)
CHLORIDE SERPL-SCNC: 106 MMOL/L (ref 98–112)
CO2 SERPL-SCNC: 27 MMOL/L (ref 21–32)
CREAT BLD-MCNC: 1.02 MG/DL
EGFRCR SERPLBLD CKD-EPI 2021: 90 ML/MIN/1.73M2 (ref 60–?)
ERYTHROCYTE [DISTWIDTH] IN BLOOD BY AUTOMATED COUNT: 11.9 %
EST. AVERAGE GLUCOSE BLD GHB EST-MCNC: 103 MG/DL (ref 68–126)
GLUCOSE BLD-MCNC: 98 MG/DL (ref 70–99)
HBA1C MFR BLD: 5.2 % (ref ?–5.7)
HCT VFR BLD AUTO: 38.7 %
HGB BLD-MCNC: 13.7 G/DL
MCH RBC QN AUTO: 31.7 PG (ref 26–34)
MCHC RBC AUTO-ENTMCNC: 35.4 G/DL (ref 31–37)
MCV RBC AUTO: 89.6 FL
OSMOLALITY SERPL CALC.SUM OF ELEC: 290 MOSM/KG (ref 275–295)
PLATELET # BLD AUTO: 225 10(3)UL (ref 150–450)
POTASSIUM SERPL-SCNC: 3.8 MMOL/L (ref 3.5–5.1)
RBC # BLD AUTO: 4.32 X10(6)UL
RH BLOOD TYPE: POSITIVE
RH BLOOD TYPE: POSITIVE
SODIUM SERPL-SCNC: 140 MMOL/L (ref 136–145)
WBC # BLD AUTO: 6.2 X10(3) UL (ref 4–11)

## 2024-12-23 PROCEDURE — 99232 SBSQ HOSP IP/OBS MODERATE 35: CPT | Performed by: HOSPITALIST

## 2024-12-23 RX ORDER — POTASSIUM CHLORIDE 1500 MG/1
40 TABLET, EXTENDED RELEASE ORAL ONCE
Status: COMPLETED | OUTPATIENT
Start: 2024-12-23 | End: 2024-12-23

## 2024-12-23 NOTE — CM/SW NOTE
RENATA noted and acknowledged order for \"surgery\".     Plan is for pt to have a CABG tomorrow.    RENATA sent HH referral in [place]. Awaiting responses. Will provide pt with options list when available.     to remain available for support and/or discharge planning.    DULCE Teague  Discharge Planner  [phone]

## 2024-12-23 NOTE — PLAN OF CARE
Receive patient at 1930. Patient is A&O x 4. Ambulates independently in room....Lungs are clear. On room air. Normal Sinus rhythm per tele monitor. No pain reported. Skin clean, dry/ intact. No edema noted.  On heparin gtt- PTT 58.9- am labs 12/24.  Bed in low position and call light within reach. Spouse at bedside. Reviewed plan of care-possible CABG on Tuesday. Patient verbalizes understanding.     Problem: Patient/Family Goals  Goal: Patient/Family Long Term Goal  Description: Patient's Long Term Goal: To stay out of the hospital    Interventions:  - Take all medications as prescribed  - Attend all follow up appointments  - See additional Care Plan goals for specific interventions  Outcome: Progressing  Goal: Patient/Family Short Term Goal  Description: Patient's Short Term Goal: To discharge home    Interventions:   - Cath 12/19  - Cardiology to follow up  - See additional Care Plan goals for specific interventions  Outcome: Progressing     Problem: CARDIOVASCULAR - ADULT  Goal: Maintains optimal cardiac output and hemodynamic stability  Description: INTERVENTIONS:  - Monitor vital signs, rhythm, and trends  - Monitor for bleeding, hypotension and signs of decreased cardiac output  - Evaluate effectiveness of vasoactive medications to optimize hemodynamic stability  - Monitor arterial and/or venous puncture sites for bleeding and/or hematoma  - Assess quality of pulses, skin color and temperature  - Assess for signs of decreased coronary artery perfusion - ex. Angina  - Evaluate fluid balance, assess for edema, trend weights  Outcome: Progressing  Goal: Absence of cardiac arrhythmias or at baseline  Description: INTERVENTIONS:  - Continuous cardiac monitoring, monitor vital signs, obtain 12 lead EKG if indicated  - Evaluate effectiveness of antiarrhythmic and heart rate control medications as ordered  - Initiate emergency measures for life threatening arrhythmias  - Monitor electrolytes and administer  replacement therapy as ordered  Outcome: Progressing     Problem: PAIN - ADULT  Goal: Verbalizes/displays adequate comfort level or patient's stated pain goal  Description: INTERVENTIONS:  - Encourage pt to monitor pain and request assistance  - Assess pain using appropriate pain scale  - Administer analgesics based on type and severity of pain and evaluate response  - Implement non-pharmacological measures as appropriate and evaluate response  - Consider cultural and social influences on pain and pain management  - Manage/alleviate anxiety  - Utilize distraction and/or relaxation techniques  - Monitor for opioid side effects  - Notify MD/LIP if interventions unsuccessful or patient reports new pain  - Anticipate increased pain with activity and pre-medicate as appropriate  Outcome: Progressing

## 2024-12-23 NOTE — PROGRESS NOTES
[facility]   part of [facility]     Hospitalist Progress Note     Drew Ozuna Patient Status:  Inpatient    1975 MRN [id number]   Location [facility] 6NE-A Attending Marcelino Madrid*   Hosp Day # 5 PCP PHYSICIAN NONSTAFF     Chief Complaint: Chest pain    Subjective:     Patient doing well overall, no chest pain.    Objective:    Review of Systems:   A comprehensive review of systems was completed; pertinent positive and negatives stated in subjective.    Vital signs:  Temp:  [97.7 °F (36.5 °C)-98.5 °F (36.9 °C)] 97.8 °F (36.6 °C)  Pulse:  [66-91] 72  Resp:  [15-18] 16  BP: ()/(68-87) 105/77  SpO2:  [91 %-95 %] 92 %    Physical Exam:    General: No acute distress  Respiratory: No wheezes, no rhonchi  Cardiovascular: S1, S2, regular rate and rhythm  Abdomen: Soft, Non-tender, non-distended, positive bowel sounds  Neuro: No new focal deficits.   Extremities: No edema      Diagnostic Data:    Labs:  Recent Labs   Lab 24  0826 24  2324 24  0726 24  0529 24  0618 24  0346   WBC 5.0 5.6  --   --   --  6.2   HGB 14.5 13.8  --   --   --  13.7   MCV 90.0 91.0  --   --   --  89.6   .0 217.0 216.0 229.0 235.0 225.0       Recent Labs   Lab 24  0826 24  0451 24  2324 24  0726 24  0930 24  0346   GLU 90  --  111*  --  89 98   BUN 11  --  11  --  12 12   CREATSERUM 0.90  --  0.86  --  0.92 1.02   CA 9.6  --  9.2  --  9.4 9.6   ALB 4.6  --   --   --   --   --      --  138  --  140 140   K 3.7   < > 3.8 3.9 3.8 3.8     --  106  --  107 106   CO2 26.0  --  26.0  --  26.0 27.0   ALKPHO 63  --   --   --   --   --    AST 31  --   --   --   --   --    ALT 40  --   --   --   --   --    BILT 1.1  --   --   --   --   --    TP 7.5  --   --   --   --   --     < > = values in this interval not displayed.       Estimated Creatinine Clearance: 84.8 mL/min (based on SCr of 1.02 mg/dL).    Recent Labs   Lab  12/18/24  0826 12/18/24  1020   TROPHS <3 <3       No results for input(s): \"PTP\", \"INR\" in the last 168 hours.               Microbiology    No results found for this visit on 12/18/24.      Imaging: Reviewed in Epic.    Medications:    mupirocin  1 Application Nasal Now then every 0500    atorvastatin  80 mg Oral Nightly    docusate sodium  100 mg Oral BID    metoprolol tartrate  12.5 mg Oral 2x Daily(Beta Blocker)    aspirin  81 mg Oral Daily       Assessment & Plan:      #Chest pain  Abnormal stress test  S/p LHC following ASA desensitization, MV CAD involving /subtotal occlusion of the proximal LAD  Heparin gtt  Cardiology following  Echo reviewed, EF 60-65%  CV surgery following, plan for CABG tomorrow  Monitor on telemetry     #GERD     #Obesity, BMI 30         Keven Farrell MD    Supplementary Documentation:     Quality:  DVT Mechanical Prophylaxis:   SCDs,    DVT Pharmacologic Prophylaxis   Medication    heparin (Porcine) 44866 units/250mL infusion ACS/AFIB CONTINUOUS         DVT Pharmacologic prophylaxis: Aspirin 81 mg      Code Status: Not on file  Decker: No urinary catheter in place  Decker Duration (in days):   Central line:    ANDRESSA: 12/29/2024    Discharge is dependent on: progress  At this point Mr. Ozuna is expected to be discharge to: home    The 21st Century Cures Act makes medical notes like these available to patients in the interest of transparency. Please be advised this is a medical document. Medical documents are intended to carry relevant information, facts as evident, and the clinical opinion of the practitioner. The medical note is intended as peer to peer communication and may appear blunt or direct. It is written in medical language and may contain abbreviations or verbiage that are unfamiliar.

## 2024-12-23 NOTE — DISCHARGE INSTRUCTIONS
Home health services provided by: [facility]. Phone: [phone]; Fax:[phone]    Your appointment with Jess Hilton on 1/21 has an address change, we will be in the new building on the hospital campus:  [facility]  [address] [place] [address]    To access the Dr. Massey discharge instructions video on your home computer:   [email or web address]  Remove steri strips from all incisions on 1/7

## 2024-12-23 NOTE — PROGRESS NOTES
Progress Note  Drew Ozuna Patient Status:  Inpatient    1975 MRN [id number]   Location [facility] 2NE-A Attending Marcelino Madrid*   Hosp Day # 5 PCP PHYSICIAN NONSTAFF     Subjective:  He is resting comfortably in bed. Denies concerns of chest discomfort or shortness of breath. Plan for CABG with CV surgery. Wife present at bedside.     Objective:  /77 (BP Location: Left arm)   Pulse 72   Temp 97.8 °F (36.6 °C) (Oral)   Resp 16   Ht 5' 7.99\" (1.727 m)   Wt 195 lb 15.8 oz (88.9 kg)   SpO2 92%   BMI 29.81 kg/m²     Intake/Output:    Intake/Output Summary (Last 24 hours) at 2024 1025  Last data filed at 2024 2219  Gross per 24 hour   Intake 240 ml   Output 600 ml   Net -360 ml       Last 3 Weights   24 0421 195 lb 15.8 oz (88.9 kg)   24 0000 198 lb (89.8 kg)   24 0000 197 lb 5 oz (89.5 kg)   24 1213 198 lb 6.6 oz (90 kg)   24 1132 198 lb 6.6 oz (90 kg)   24 1100 198 lb 6.6 oz (90 kg)   24 0740 205 lb (93 kg)   24 1706 205 lb (93 kg)   10/14/23 1129 210 lb (95.3 kg)       Labs:  Recent Labs   Lab 24  0726 24  0930 24  0346   *  --  89 98   BUN 11  --  12 12   CREATSERUM 0.86  --  0.92 1.02   EGFRCR 106  --  102 90   CA 9.2  --  9.4 9.6     --  140 140   K 3.8 3.9 3.8 3.8     --  107 106   CO2 26.0  --  26.0 27.0     Recent Labs   Lab 24  0826 24  23224  0726 24  0529 24  0618 24  0346   RBC 4.60 4.32  --   --   --  4.32   HGB 14.5 13.8  --   --   --  13.7   HCT 41.4 39.3  --   --   --  38.7*   MCV 90.0 91.0  --   --   --  89.6   MCH 31.5 31.9  --   --   --  31.7   MCHC 35.0 35.1  --   --   --  35.4   RDW 11.9 11.7  --   --   --  11.9   NEPRELIM 3.28  --   --   --   --   --    WBC 5.0 5.6  --   --   --  6.2   .0 217.0   < > 229.0 235.0 225.0    < > = values in this interval not displayed.         Recent Labs   Lab  12/18/24  0826 12/18/24  1020   TROPHS <3 <3       Diagnostics:   Telemetry: NSR    Review of Systems   Cardiovascular:  Negative for chest pain.   Respiratory:  Negative for shortness of breath.        Physical Exam:  General: Alert and oriented in no apparent distress.  HEENT: Pupils equal. Mucous membranes moist.   Neck: No JVD  Cardiac:  Normal S1 S2, Regular. No murmur  Lungs: Clear without wheezes or crackles    Abdomen: Soft, non-tender, ND  Extremities: Without clubbing, cyanosis or edema.    Neurologic: No focal deficits. Normal affect.  Skin: Warm and dry,    Medications:   atorvastatin  80 mg Oral Nightly    docusate sodium  100 mg Oral BID    metoprolol tartrate  12.5 mg Oral 2x Daily(Beta Blocker)    aspirin  81 mg Oral Daily      continuous dose heparin 1,150 Units/hr (12/22/24 1611)    sodium chloride Stopped (12/19/24 2000)       Assessment:    Multivessel CAD involving /subtotal occlusion of proximal CAD  Presented with chest pain and abnormal stress test. S/p LHC 12/19/24 with  prox LAD and 60% stenosis of OM1  Plan for CABG with CV surgery 12/24/24  Preserved LVEF 60-65%  Aspirin, statin, BB. On heparin gtt.   Aspirin hypersensitivity s/p desensitization this admission - currently tolerating aspirin well without side effects  HLD - high intensity statin started this admission. . He has a recent script for atorvastatin at home but had not yet started.    Plan:    Continue current medication regimen, aspirin, statin, BB, heparin gtt. Chest pain free. Plan for CABG with CV surgery tomorrow.      Plan of care discussed with patient, RN.    NAIDA Harrell  12/23/2024  10:25 AM      I have personally seen and examined patient.   I have independently formulated assessment and plan  I agree with the AP note    No CP or pressure. Awaiting surgery tomorrow  Discussed with patient and family    Will cont to follow  Thank you for the opportunity to participate in the care of your patient.      Aristeo Stovall MD  Interventional Cardiologist  [facility]

## 2024-12-23 NOTE — ANESTHESIA PREPROCEDURE EVALUATION
PRE-OP EVALUATION    Patient Name: Drew Ozuna    Admit Diagnosis: Acute chest pain [R07.9]    Pre-op Diagnosis: coronary artery disease    CORONARY ARTERY BYPASS GRAFT    Anesthesia Procedure: CORONARY ARTERY BYPASS GRAFT    Surgeons and Role:     * Deep Rausch MD - Primary     * Randell Massey MD    Pre-op vitals reviewed.  Temp: 97.8 °F (36.6 °C)  Pulse: 72  Resp: 16  BP: 105/77  SpO2: 92 %  Body mass index is 29.81 kg/m².    Current medications reviewed.  Hospital Medications:  • [COMPLETED] potassium chloride (Klor-Con M20) tab 40 mEq  40 mEq Oral Once   • [START ON 2024] insulin regular human (Novolin R, Humulin R) 100 Units in sodium chloride 0.9% 100 mL standard infusion (100 mL)  1-40 Units/hr Intravenous PRN   • [START ON 2024] EPINEPHrine (Adrenalin) 5 mg in sodium chloride 0.9% 250 mL infusion  1-10 mcg/min Intravenous PRN   • atorvastatin (Lipitor) tab 80 mg  80 mg Oral Nightly   • docusate sodium (Colace) cap 100 mg  100 mg Oral BID   • ALPRAZolam (Xanax) tab 0.25 mg  0.25 mg Oral TID PRN   • metoprolol tartrate (Lopressor) partial tab 12.5 mg  12.5 mg Oral 2x Daily(Beta Blocker)   • [COMPLETED] diphenhydrAMINE (Benadryl) cap/tab 25 mg  25 mg Oral q6h   • [] EPINEPHrine (Adrenalin) 1 MG/ML injection (Allergic Reaction Kit) 0.3 mg  0.3 mg Intramuscular Once PRN   • [] hydrocortisone Na succinate PF (Solu-CORTEF) injection 100 mg  100 mg Intravenous Once PRN   • [] diphenhydrAMINE (Benadryl) 50 mg/mL  injection 25 mg  25 mg Intravenous Q6H PRN   • [] famotidine (Pepcid) 20 mg/2mL injection 20 mg  20 mg Intravenous Once PRN   • [COMPLETED] potassium chloride (Klor-Con M20) tab 40 mEq  40 mEq Oral Once   • [COMPLETED] Aspirin 0.1 mg / 0.1 ml solution  0.1 mg Oral Q20 min    Followed by   • [COMPLETED] Aspirin 0.3 mg / 0.3 ml solution  0.3 mg Oral Q20 min    Followed by   • [COMPLETED] Aspirin 1 mg / 1 ml solution  1 mg Oral Q20 min    Followed by   •  [COMPLETED] Aspirin 3 mg / 3 ml solution  3 mg Oral Q20 min    Followed by   • [COMPLETED] Aspirin 10 mg / 10 ml solution  10 mg Oral Q20 min    Followed by   • [COMPLETED] Aspirin 20 mg / 20 ml solution  20 mg Oral Q20 min    Followed by   • [COMPLETED] aspirin chewable tab 40.5 mg  40.5 mg Oral Q20 min   • aspirin DR tab 81 mg  81 mg Oral Daily   • [COMPLETED] lidocaine PF (Xylocaine-MPF) 1 % injection       • [COMPLETED] heparin (Porcine) 5000 UNIT/ML injection       • [COMPLETED] verapamil (Isoptin) 2.5 mg/mL injection       • [COMPLETED] Nitroglycerin in D5W 200-5 MCG/ML-% injection       • [COMPLETED] fentaNYL (Sublimaze) 50 mcg/mL injection       • [COMPLETED] midazolam (Versed) 2 MG/2ML injection       • [COMPLETED] iopamidol (ISOVUE-370) 76 % injection 200 mL  200 mL Injection ONCE PRN   • [COMPLETED] heparin (Porcine) 5000 UNIT/ML injection       • [COMPLETED] heparin (Porcine) 88632 units/250 mL infusion (ACS/AFIB) INITIAL DOSE  1,000 Units/hr Intravenous Once   • heparin (Porcine) 11362 units/250mL infusion ACS/AFIB CONTINUOUS  200-3,000 Units/hr Intravenous Continuous   • acetaminophen (Tylenol Extra Strength) tab 500 mg  500 mg Oral Q4H PRN   • melatonin tab 3 mg  3 mg Oral Nightly PRN   • glycerin-hypromellose- (Artificial Tears) 0.2-0.2-1 % ophthalmic solution 1 drop  1 drop Both Eyes QID PRN   • sodium chloride (Saline Mist) 0.65 % nasal solution 1 spray  1 spray Each Nare Q3H PRN   • sodium chloride 0.9% infusion   Intravenous Continuous   • ondansetron (Zofran) 4 MG/2ML injection 4 mg  4 mg Intravenous Q6H PRN   • prochlorperazine (Compazine) 10 MG/2ML injection 5 mg  5 mg Intravenous Q8H PRN   • [] sodium chloride 0.9% infusion   Intravenous On Call   • [COMPLETED] potassium chloride (Klor-Con M20) tab 40 mEq  40 mEq Oral Once       Outpatient Medications:   Prescriptions Prior to Admission[1]    Allergies: Asa [aspirin]      Anesthesia Evaluation    Patient summary  reviewed.    Anesthetic Complications  (-) history of anesthetic complications         GI/Hepatic/Renal      (+) GERD                           Cardiovascular                     (+) hyperlipidemia  (+) CAD                                Endo/Other    Negative endo/other ROS.                              Pulmonary      (+) asthma                     Neuro/Psych    Negative neuro/psych ROS.                          MV CAD involving /subtotal occlusion of proximal LAD  EF 60-65%  ADA hypersensitivity, s/p desensitization    History reviewed. No pertinent surgical history.  Social History     Socioeconomic History   • Marital status: Single   Tobacco Use   • Smoking status: Never   Vaping Use   • Vaping status: Never Used   Substance and Sexual Activity   • Alcohol use: Yes     Comment: 1-2 drinks a day   • Drug use: Yes     Types: Cannabis     Comment: CBD gummies 1 a day     History   Drug Use   • Types: Cannabis     Comment: CBD gummies 1 a day     Available pre-op labs reviewed.  Lab Results   Component Value Date    WBC 6.2 12/23/2024    RBC 4.32 12/23/2024    HGB 13.7 12/23/2024    HCT 38.7 (L) 12/23/2024    MCV 89.6 12/23/2024    MCH 31.7 12/23/2024    MCHC 35.4 12/23/2024    RDW 11.9 12/23/2024    .0 12/23/2024     Lab Results   Component Value Date     12/23/2024    K 3.8 12/23/2024     12/23/2024    CO2 27.0 12/23/2024    BUN 12 12/23/2024    CREATSERUM 1.02 12/23/2024    GLU 98 12/23/2024    CA 9.6 12/23/2024     Lab Results   Component Value Date    INR 0.99 12/05/2024         Airway      Mallampati: II  Mouth opening: >3 FB  TM distance: > 6 cm  Neck ROM: full Cardiovascular    Cardiovascular exam normal.  Rhythm: regular  Rate: normal     Dental    Dentition appears grossly intact         Pulmonary    Pulmonary exam normal.                 Other findings        ASA: 3   Plan: general  NPO status verified and patient meets guidelines.    Post-procedure pain management plan discussed  with surgeon and patient.    Comment: Options, risks and benefits of anesthesia as outlined in the anesthesia consent were reviewed with the patient. Risks and benefits of GA including sore throat, allergy, nausea, vomiting, dental trauma, pain management modalities were all discussed. Particularly the risk of dental trauma with weakened teeth or crowns, partials, fillings and any non natural teeth due to instrumentation of oral cavity and airway. Patient understands risks and verbally agreed to proceed. All questions answered.The consent was signed without further questions. Discussed the need for and risks of invasive monitors that may be placed for the scheduled procedure.   Discussion was had regarding the possible need for post-operative intubation and ventilation.              Plan/risks discussed with: patient            Present on Admission:  • Acute chest pain             [1]   Medications Prior to Admission   Medication Sig Dispense Refill Last Dose/Taking   • metoprolol tartrate 25 MG Oral Tab Take 0.5 tablets (12.5 mg total) by mouth 2 (two) times daily.      • atorvastatin 80 MG Oral Tab Take 1 tablet (80 mg total) by mouth daily.      • clopidogrel 75 MG Oral Tab Take 1 tablet (75 mg total) by mouth daily.

## 2024-12-23 NOTE — PLAN OF CARE
Patient alert and oriented x 4. On RA. Up with SBA. NSR on tele. Continent of bowel/bladder. No complaints of pain, shortness of breath, chest pain/discomfort. POC: heparin gtt, CABG tomorrow. Call light within reach. Fall precautions in place.     Problem: Patient/Family Goals  Goal: Patient/Family Long Term Goal  Description: Patient's Long Term Goal: To stay out of the hospital    Interventions:  - Take all medications as prescribed  - Attend all follow up appointments  - See additional Care Plan goals for specific interventions  Outcome: Progressing  Goal: Patient/Family Short Term Goal  Description: Patient's Short Term Goal: To discharge home    Interventions:   - Cath 12/19  - Cardiology to follow up  - See additional Care Plan goals for specific interventions  Outcome: Progressing     Problem: CARDIOVASCULAR - ADULT  Goal: Maintains optimal cardiac output and hemodynamic stability  Description: INTERVENTIONS:  - Monitor vital signs, rhythm, and trends  - Monitor for bleeding, hypotension and signs of decreased cardiac output  - Evaluate effectiveness of vasoactive medications to optimize hemodynamic stability  - Monitor arterial and/or venous puncture sites for bleeding and/or hematoma  - Assess quality of pulses, skin color and temperature  - Assess for signs of decreased coronary artery perfusion - ex. Angina  - Evaluate fluid balance, assess for edema, trend weights  Outcome: Progressing  Goal: Absence of cardiac arrhythmias or at baseline  Description: INTERVENTIONS:  - Continuous cardiac monitoring, monitor vital signs, obtain 12 lead EKG if indicated  - Evaluate effectiveness of antiarrhythmic and heart rate control medications as ordered  - Initiate emergency measures for life threatening arrhythmias  - Monitor electrolytes and administer replacement therapy as ordered  Outcome: Progressing     Problem: PAIN - ADULT  Goal: Verbalizes/displays adequate comfort level or patient's stated pain  goal  Description: INTERVENTIONS:  - Encourage pt to monitor pain and request assistance  - Assess pain using appropriate pain scale  - Administer analgesics based on type and severity of pain and evaluate response  - Implement non-pharmacological measures as appropriate and evaluate response  - Consider cultural and social influences on pain and pain management  - Manage/alleviate anxiety  - Utilize distraction and/or relaxation techniques  - Monitor for opioid side effects  - Notify MD/LIP if interventions unsuccessful or patient reports new pain  - Anticipate increased pain with activity and pre-medicate as appropriate  Outcome: Progressing

## 2024-12-24 ENCOUNTER — APPOINTMENT (OUTPATIENT)
Dept: GENERAL RADIOLOGY | Facility: HOSPITAL | Age: 49
End: 2024-12-24
Attending: PHYSICIAN ASSISTANT
Payer: OTHER GOVERNMENT

## 2024-12-24 ENCOUNTER — ANESTHESIA (OUTPATIENT)
Dept: CARDIAC SURGERY | Facility: HOSPITAL | Age: 49
End: 2024-12-24
Payer: OTHER GOVERNMENT

## 2024-12-24 LAB
APTT PPP: 26.4 SECONDS (ref 23–36)
APTT PPP: 26.6 SECONDS (ref 23–36)
BASE EXCESS BLD CALC-SCNC: -2 MMOL/L
BASE EXCESS BLD CALC-SCNC: -5 MMOL/L
BASE EXCESS BLDA CALC-SCNC: -0.7 MMOL/L (ref ?–2)
BASE EXCESS BLDA CALC-SCNC: -2.9 MMOL/L (ref ?–2)
BASE EXCESS BLDA CALC-SCNC: 0.1 MMOL/L (ref ?–2)
BODY TEMPERATURE: 98.6 F
BUN BLD-MCNC: 11 MG/DL (ref 9–23)
CA-I BLD-SCNC: 1.2 MMOL/L (ref 1.12–1.32)
CA-I BLD-SCNC: 1.22 MMOL/L (ref 1.12–1.32)
CA-I BLD-SCNC: 1.24 MMOL/L (ref 0.95–1.32)
CALCIUM BLD-MCNC: 9.2 MG/DL (ref 8.7–10.4)
CHLORIDE SERPL-SCNC: 107 MMOL/L (ref 98–112)
CO2 BLD-SCNC: 22 MMOL/L (ref 22–32)
CO2 BLD-SCNC: 25 MMOL/L (ref 22–32)
CO2 SERPL-SCNC: 23 MMOL/L (ref 21–32)
COHGB MFR BLD: 1.4 % SAT (ref 0–3)
COHGB MFR BLD: 1.5 % SAT (ref 0–3)
COHGB MFR BLD: 1.7 % SAT (ref 0–3)
CPAP: 5 CM H2O
CREAT BLD-MCNC: 0.94 MG/DL
EGFRCR SERPLBLD CKD-EPI 2021: 99 ML/MIN/1.73M2 (ref 60–?)
ERYTHROCYTE [DISTWIDTH] IN BLOOD BY AUTOMATED COUNT: 11.9 %
FIBRINOGEN PPP-MCNC: 314 MG/DL (ref 200–480)
FIBRINOGEN PPP-MCNC: 341 MG/DL (ref 200–480)
FIO2: 50 %
FIO2: 50 %
FIO2: 60 %
GLUCOSE BLD-MCNC: 108 MG/DL (ref 70–99)
GLUCOSE BLD-MCNC: 124 MG/DL (ref 70–99)
GLUCOSE BLD-MCNC: 127 MG/DL (ref 70–99)
GLUCOSE BLD-MCNC: 152 MG/DL (ref 70–99)
GLUCOSE BLD-MCNC: 154 MG/DL (ref 70–99)
GLUCOSE BLD-MCNC: 164 MG/DL (ref 70–99)
GLUCOSE BLD-MCNC: 188 MG/DL (ref 70–99)
GLUCOSE BLD-MCNC: 189 MG/DL (ref 70–99)
GLUCOSE BLD-MCNC: 189 MG/DL (ref 70–99)
GLUCOSE BLD-MCNC: 196 MG/DL (ref 70–99)
GLUCOSE BLD-MCNC: 233 MG/DL (ref 70–99)
GLUCOSE BLD-MCNC: 238 MG/DL (ref 70–99)
GLUCOSE BLD-MCNC: 92 MG/DL (ref 70–99)
HCO3 BLD-SCNC: 21 MEQ/L
HCO3 BLD-SCNC: 23.5 MEQ/L
HCO3 BLDA-SCNC: 22.6 MEQ/L (ref 21–27)
HCO3 BLDA-SCNC: 24.4 MEQ/L (ref 21–27)
HCO3 BLDA-SCNC: 24.9 MEQ/L (ref 21–27)
HCT VFR BLD AUTO: 37.3 %
HCT VFR BLD CALC: 35 %
HCT VFR BLD CALC: 38 %
HGB BLD-MCNC: 13.5 G/DL
HGB BLD-MCNC: 13.8 G/DL
HGB BLD-MCNC: 14 G/DL
HGB BLD-MCNC: 14.1 G/DL
INR BLD: 1.31 (ref 0.8–1.2)
INR BLD: 1.41 (ref 0.8–1.2)
ISTAT ACTIVATED CLOTTING TIME: 129 SECONDS (ref 74–137)
ISTAT ACTIVATED CLOTTING TIME: 452 SECONDS (ref 74–137)
LACTATE BLD-SCNC: 6.1 MMOL/L (ref 0.5–2)
LACTATE BLD-SCNC: 7.7 MMOL/L (ref 0.5–2)
MAGNESIUM SERPL-MCNC: 2.3 MG/DL (ref 1.6–2.6)
MCH RBC QN AUTO: 32.1 PG (ref 26–34)
MCHC RBC AUTO-ENTMCNC: 36.2 G/DL (ref 31–37)
MCV RBC AUTO: 88.8 FL
METHGB MFR BLD: 0.6 % SAT (ref 0.4–1.5)
METHGB MFR BLD: 0.6 % SAT (ref 0.4–1.5)
METHGB MFR BLD: 0.8 % SAT (ref 0.4–1.5)
OXYHGB MFR BLDA: 95.3 % (ref 92–100)
OXYHGB MFR BLDA: 96.2 % (ref 92–100)
OXYHGB MFR BLDA: 97.4 % (ref 92–100)
P/F RATIO: 164 MMHG
P/F RATIO: 212 MMHG
PCO2 BLD: 41.1 MMHG
PCO2 BLD: 44.4 MMHG
PCO2 BLDA: 36 MM HG (ref 35–45)
PCO2 BLDA: 39 MM HG (ref 35–45)
PCO2 BLDA: 41 MM HG (ref 35–45)
PEEP: 5 CM H2O
PEEP: 5 CM H2O
PH BLD: 7.32 [PH]
PH BLD: 7.33 [PH]
PH BLDA: 7.35 [PH] (ref 7.35–7.45)
PH BLDA: 7.41 [PH] (ref 7.35–7.45)
PH BLDA: 7.42 [PH] (ref 7.35–7.45)
PLATELET # BLD AUTO: 165 10(3)UL (ref 150–450)
PLATELET # BLD AUTO: 170 10(3)UL (ref 150–450)
PO2 BLD: 458 MMHG
PO2 BLD: 97 MMHG
PO2 BLDA: 106 MM HG (ref 80–100)
PO2 BLDA: 82 MM HG (ref 80–100)
PO2 BLDA: 93 MM HG (ref 80–100)
POTASSIUM BLD-SCNC: 2.8 MMOL/L (ref 3.6–5.1)
POTASSIUM BLD-SCNC: 3.7 MMOL/L (ref 3.6–5.1)
POTASSIUM BLD-SCNC: 3.7 MMOL/L (ref 3.6–5.1)
POTASSIUM SERPL-SCNC: 3.4 MMOL/L (ref 3.5–5.1)
POTASSIUM SERPL-SCNC: 3.7 MMOL/L (ref 3.5–5.1)
PRESSURE SUPPORT: 5 CM H2O
PROTHROMBIN TIME: 16.4 SECONDS (ref 11.6–14.8)
PROTHROMBIN TIME: 17.4 SECONDS (ref 11.6–14.8)
RBC # BLD AUTO: 4.2 X10(6)UL
SAO2 % BLD: 100 %
SAO2 % BLD: 97 %
SODIUM BLD-SCNC: 139 MMOL/L (ref 136–145)
SODIUM BLD-SCNC: 142 MMOL/L (ref 135–145)
SODIUM BLD-SCNC: 142 MMOL/L (ref 136–145)
SODIUM SERPL-SCNC: 141 MMOL/L (ref 136–145)
TIDAL VOLUME: 550 ML
TIDAL VOLUME: 550 ML
VENT RATE: 16 /MIN
VENT RATE: 16 /MIN
WBC # BLD AUTO: 13.4 X10(3) UL (ref 4–11)

## 2024-12-24 PROCEDURE — 99233 SBSQ HOSP IP/OBS HIGH 50: CPT | Performed by: INTERNAL MEDICINE

## 2024-12-24 PROCEDURE — B246ZZ4 ULTRASONOGRAPHY OF RIGHT AND LEFT HEART, TRANSESOPHAGEAL: ICD-10-PCS | Performed by: ANESTHESIOLOGY

## 2024-12-24 PROCEDURE — 5A1935Z RESPIRATORY VENTILATION, LESS THAN 24 CONSECUTIVE HOURS: ICD-10-PCS | Performed by: ANESTHESIOLOGY

## 2024-12-24 PROCEDURE — 93312 ECHO TRANSESOPHAGEAL: CPT | Performed by: ANESTHESIOLOGY

## 2024-12-24 PROCEDURE — 71045 X-RAY EXAM CHEST 1 VIEW: CPT | Performed by: PHYSICIAN ASSISTANT

## 2024-12-24 PROCEDURE — S0028 INJECTION, FAMOTIDINE, 20 MG: HCPCS | Performed by: ANESTHESIOLOGY

## 2024-12-24 PROCEDURE — 02100Z9 BYPASS CORONARY ARTERY, ONE ARTERY FROM LEFT INTERNAL MAMMARY, OPEN APPROACH: ICD-10-PCS | Performed by: THORACIC SURGERY (CARDIOTHORACIC VASCULAR SURGERY)

## 2024-12-24 PROCEDURE — 021009W BYPASS CORONARY ARTERY, ONE ARTERY FROM AORTA WITH AUTOLOGOUS VENOUS TISSUE, OPEN APPROACH: ICD-10-PCS | Performed by: THORACIC SURGERY (CARDIOTHORACIC VASCULAR SURGERY)

## 2024-12-24 PROCEDURE — 0BH17EZ INSERTION OF ENDOTRACHEAL AIRWAY INTO TRACHEA, VIA NATURAL OR ARTIFICIAL OPENING: ICD-10-PCS | Performed by: ANESTHESIOLOGY

## 2024-12-24 PROCEDURE — 06BQ4ZZ EXCISION OF LEFT SAPHENOUS VEIN, PERCUTANEOUS ENDOSCOPIC APPROACH: ICD-10-PCS | Performed by: THORACIC SURGERY (CARDIOTHORACIC VASCULAR SURGERY)

## 2024-12-24 PROCEDURE — 5A1221Z PERFORMANCE OF CARDIAC OUTPUT, CONTINUOUS: ICD-10-PCS | Performed by: THORACIC SURGERY (CARDIOTHORACIC VASCULAR SURGERY)

## 2024-12-24 DEVICE — LOCKING SCREW,AXS,SELF-DRILLING
Type: IMPLANTABLE DEVICE | Status: FUNCTIONAL
Brand: AXS, SMARTLOCK

## 2024-12-24 DEVICE — STERNALPLATE, X: Type: IMPLANTABLE DEVICE | Status: FUNCTIONAL

## 2024-12-24 RX ORDER — NITROGLYCERIN 20 MG/100ML
INJECTION INTRAVENOUS CONTINUOUS PRN
Status: DISCONTINUED | OUTPATIENT
Start: 2024-12-24 | End: 2024-12-26

## 2024-12-24 RX ORDER — IPRATROPIUM BROMIDE AND ALBUTEROL SULFATE 2.5; .5 MG/3ML; MG/3ML
3 SOLUTION RESPIRATORY (INHALATION) EVERY 4 HOURS PRN
Status: DISCONTINUED | OUTPATIENT
Start: 2024-12-24 | End: 2024-12-26

## 2024-12-24 RX ORDER — HEPARIN SODIUM 1000 [USP'U]/ML
INJECTION, SOLUTION INTRAVENOUS; SUBCUTANEOUS AS NEEDED
Status: DISCONTINUED | OUTPATIENT
Start: 2024-12-24 | End: 2024-12-24 | Stop reason: SURG

## 2024-12-24 RX ORDER — DEXTROSE MONOHYDRATE 25 G/50ML
50 INJECTION, SOLUTION INTRAVENOUS
Status: DISCONTINUED | OUTPATIENT
Start: 2024-12-24 | End: 2024-12-26

## 2024-12-24 RX ORDER — DOBUTAMINE HYDROCHLORIDE 200 MG/100ML
INJECTION INTRAVENOUS CONTINUOUS PRN
Status: DISCONTINUED | OUTPATIENT
Start: 2024-12-24 | End: 2024-12-26

## 2024-12-24 RX ORDER — ACETAMINOPHEN 10 MG/ML
1000 INJECTION, SOLUTION INTRAVENOUS EVERY 6 HOURS PRN
Status: DISPENSED | OUTPATIENT
Start: 2024-12-24 | End: 2024-12-25

## 2024-12-24 RX ORDER — POLYETHYLENE GLYCOL 3350 17 G/17G
17 POWDER, FOR SOLUTION ORAL DAILY PRN
Status: DISCONTINUED | OUTPATIENT
Start: 2024-12-24 | End: 2024-12-27

## 2024-12-24 RX ORDER — DOCUSATE SODIUM 100 MG/1
100 CAPSULE, LIQUID FILLED ORAL 2 TIMES DAILY
Status: DISCONTINUED | OUTPATIENT
Start: 2024-12-25 | End: 2024-12-27

## 2024-12-24 RX ORDER — NICOTINE POLACRILEX 4 MG
30 LOZENGE BUCCAL
Status: DISCONTINUED | OUTPATIENT
Start: 2024-12-24 | End: 2024-12-26

## 2024-12-24 RX ORDER — NALOXONE HYDROCHLORIDE 0.4 MG/ML
0.08 INJECTION, SOLUTION INTRAMUSCULAR; INTRAVENOUS; SUBCUTANEOUS
Status: DISCONTINUED | OUTPATIENT
Start: 2024-12-24 | End: 2024-12-26

## 2024-12-24 RX ORDER — ROCURONIUM BROMIDE 10 MG/ML
INJECTION, SOLUTION INTRAVENOUS AS NEEDED
Status: DISCONTINUED | OUTPATIENT
Start: 2024-12-24 | End: 2024-12-24 | Stop reason: SURG

## 2024-12-24 RX ORDER — BISACODYL 10 MG
10 SUPPOSITORY, RECTAL RECTAL
Status: DISCONTINUED | OUTPATIENT
Start: 2024-12-24 | End: 2024-12-27

## 2024-12-24 RX ORDER — PANTOPRAZOLE SODIUM 40 MG/1
40 TABLET, DELAYED RELEASE ORAL
Status: DISCONTINUED | OUTPATIENT
Start: 2024-12-24 | End: 2024-12-27

## 2024-12-24 RX ORDER — MAGNESIUM SULFATE HEPTAHYDRATE 40 MG/ML
2 INJECTION, SOLUTION INTRAVENOUS AS NEEDED
Status: DISCONTINUED | OUTPATIENT
Start: 2024-12-24 | End: 2024-12-26

## 2024-12-24 RX ORDER — DIPHENHYDRAMINE HYDROCHLORIDE 50 MG/ML
12.5 INJECTION INTRAMUSCULAR; INTRAVENOUS EVERY 4 HOURS PRN
Status: DISCONTINUED | OUTPATIENT
Start: 2024-12-24 | End: 2024-12-26

## 2024-12-24 RX ORDER — DEXMEDETOMIDINE HYDROCHLORIDE 4 UG/ML
INJECTION, SOLUTION INTRAVENOUS CONTINUOUS
Status: DISCONTINUED | OUTPATIENT
Start: 2024-12-24 | End: 2024-12-26

## 2024-12-24 RX ORDER — PROTAMINE SULFATE 10 MG/ML
INJECTION, SOLUTION INTRAVENOUS AS NEEDED
Status: DISCONTINUED | OUTPATIENT
Start: 2024-12-24 | End: 2024-12-24 | Stop reason: SURG

## 2024-12-24 RX ORDER — MIDAZOLAM HYDROCHLORIDE 1 MG/ML
1 INJECTION INTRAMUSCULAR; INTRAVENOUS EVERY 30 MIN PRN
Status: DISCONTINUED | OUTPATIENT
Start: 2024-12-24 | End: 2024-12-26

## 2024-12-24 RX ORDER — MORPHINE SULFATE 4 MG/ML
4 INJECTION, SOLUTION INTRAMUSCULAR; INTRAVENOUS EVERY 2 HOUR PRN
Status: DISCONTINUED | OUTPATIENT
Start: 2024-12-24 | End: 2024-12-27

## 2024-12-24 RX ORDER — SODIUM CHLORIDE 9 MG/ML
INJECTION, SOLUTION INTRAVENOUS CONTINUOUS PRN
Status: DISCONTINUED | OUTPATIENT
Start: 2024-12-24 | End: 2024-12-24 | Stop reason: SURG

## 2024-12-24 RX ORDER — ALBUMIN HUMAN 50 G/1000ML
12.5 SOLUTION INTRAVENOUS ONCE AS NEEDED
Status: DISPENSED | OUTPATIENT
Start: 2024-12-24 | End: 2024-12-25

## 2024-12-24 RX ORDER — ASPIRIN 81 MG/1
81 TABLET ORAL DAILY
Status: DISCONTINUED | OUTPATIENT
Start: 2024-12-25 | End: 2024-12-27

## 2024-12-24 RX ORDER — CHLORHEXIDINE GLUCONATE ORAL RINSE 1.2 MG/ML
15 SOLUTION DENTAL
Status: DISCONTINUED | OUTPATIENT
Start: 2024-12-24 | End: 2024-12-26

## 2024-12-24 RX ORDER — ALBUMIN HUMAN 50 G/1000ML
25 SOLUTION INTRAVENOUS ONCE
Status: COMPLETED | OUTPATIENT
Start: 2024-12-24 | End: 2024-12-24

## 2024-12-24 RX ORDER — POTASSIUM CHLORIDE 14.9 MG/ML
20 INJECTION INTRAVENOUS AS NEEDED
Status: DISCONTINUED | OUTPATIENT
Start: 2024-12-24 | End: 2024-12-26

## 2024-12-24 RX ORDER — PHENYLEPHRINE HCL 10 MG/ML
VIAL (ML) INJECTION AS NEEDED
Status: DISCONTINUED | OUTPATIENT
Start: 2024-12-24 | End: 2024-12-24 | Stop reason: SURG

## 2024-12-24 RX ORDER — ONDANSETRON 2 MG/ML
4 INJECTION INTRAMUSCULAR; INTRAVENOUS EVERY 6 HOURS PRN
Status: DISCONTINUED | OUTPATIENT
Start: 2024-12-24 | End: 2024-12-27

## 2024-12-24 RX ORDER — FAMOTIDINE 10 MG/ML
INJECTION, SOLUTION INTRAVENOUS AS NEEDED
Status: DISCONTINUED | OUTPATIENT
Start: 2024-12-24 | End: 2024-12-24 | Stop reason: SURG

## 2024-12-24 RX ORDER — VANCOMYCIN HYDROCHLORIDE
15 EVERY 12 HOURS
Status: COMPLETED | OUTPATIENT
Start: 2024-12-24 | End: 2024-12-25

## 2024-12-24 RX ORDER — MORPHINE SULFATE 2 MG/ML
2 INJECTION, SOLUTION INTRAMUSCULAR; INTRAVENOUS EVERY 2 HOUR PRN
Status: DISCONTINUED | OUTPATIENT
Start: 2024-12-24 | End: 2024-12-27

## 2024-12-24 RX ORDER — DIPHENHYDRAMINE HYDROCHLORIDE 50 MG/ML
INJECTION INTRAMUSCULAR; INTRAVENOUS AS NEEDED
Status: DISCONTINUED | OUTPATIENT
Start: 2024-12-24 | End: 2024-12-24 | Stop reason: SURG

## 2024-12-24 RX ORDER — ALBUTEROL SULFATE 90 UG/1
INHALANT RESPIRATORY (INHALATION) AS NEEDED
Status: DISCONTINUED | OUTPATIENT
Start: 2024-12-24 | End: 2024-12-24 | Stop reason: SURG

## 2024-12-24 RX ORDER — DOBUTAMINE HYDROCHLORIDE 200 MG/100ML
INJECTION INTRAVENOUS CONTINUOUS PRN
Status: DISCONTINUED | OUTPATIENT
Start: 2024-12-24 | End: 2024-12-24 | Stop reason: SURG

## 2024-12-24 RX ORDER — METHYLPREDNISOLONE SODIUM SUCCINATE 500 MG/8ML
INJECTION INTRAMUSCULAR; INTRAVENOUS AS NEEDED
Status: DISCONTINUED | OUTPATIENT
Start: 2024-12-24 | End: 2024-12-24 | Stop reason: SURG

## 2024-12-24 RX ORDER — SODIUM CHLORIDE 9 MG/ML
INJECTION, SOLUTION INTRAVENOUS CONTINUOUS
Status: DISCONTINUED | OUTPATIENT
Start: 2024-12-24 | End: 2024-12-26

## 2024-12-24 RX ORDER — DEXTROSE MONOHYDRATE AND SODIUM CHLORIDE 5; .45 G/100ML; G/100ML
INJECTION, SOLUTION INTRAVENOUS CONTINUOUS
Status: DISCONTINUED | OUTPATIENT
Start: 2024-12-24 | End: 2024-12-26

## 2024-12-24 RX ORDER — MIDAZOLAM HYDROCHLORIDE 1 MG/ML
INJECTION INTRAMUSCULAR; INTRAVENOUS AS NEEDED
Status: DISCONTINUED | OUTPATIENT
Start: 2024-12-24 | End: 2024-12-24 | Stop reason: SURG

## 2024-12-24 RX ORDER — SENNOSIDES 8.6 MG
8.6 TABLET ORAL 2 TIMES DAILY
Status: DISCONTINUED | OUTPATIENT
Start: 2024-12-25 | End: 2024-12-27

## 2024-12-24 RX ORDER — POTASSIUM CHLORIDE 29.8 MG/ML
40 INJECTION INTRAVENOUS AS NEEDED
Status: DISCONTINUED | OUTPATIENT
Start: 2024-12-24 | End: 2024-12-26

## 2024-12-24 RX ORDER — NICOTINE POLACRILEX 4 MG
15 LOZENGE BUCCAL
Status: DISCONTINUED | OUTPATIENT
Start: 2024-12-24 | End: 2024-12-26

## 2024-12-24 RX ORDER — MAGNESIUM SULFATE 1 G/100ML
1 INJECTION INTRAVENOUS AS NEEDED
Status: DISCONTINUED | OUTPATIENT
Start: 2024-12-24 | End: 2024-12-26

## 2024-12-24 RX ORDER — DEXMEDETOMIDINE HYDROCHLORIDE 4 UG/ML
INJECTION, SOLUTION INTRAVENOUS CONTINUOUS PRN
Status: DISCONTINUED | OUTPATIENT
Start: 2024-12-24 | End: 2024-12-24 | Stop reason: SURG

## 2024-12-24 RX ADMIN — MIDAZOLAM HYDROCHLORIDE 2 MG: 1 INJECTION INTRAMUSCULAR; INTRAVENOUS at 07:19:00

## 2024-12-24 RX ADMIN — PROTAMINE SULFATE 50 MG: 10 INJECTION, SOLUTION INTRAVENOUS at 10:20:00

## 2024-12-24 RX ADMIN — PHENYLEPHRINE HCL 100 MCG: 10 MG/ML VIAL (ML) INJECTION at 07:54:00

## 2024-12-24 RX ADMIN — ALBUTEROL SULFATE 4 PUFF: 90 INHALANT RESPIRATORY (INHALATION) at 10:20:00

## 2024-12-24 RX ADMIN — DEXMEDETOMIDINE HYDROCHLORIDE 0.5 MCG/KG/HR: 4 INJECTION, SOLUTION INTRAVENOUS at 07:44:00

## 2024-12-24 RX ADMIN — PROTAMINE SULFATE 50 MG: 10 INJECTION, SOLUTION INTRAVENOUS at 10:28:00

## 2024-12-24 RX ADMIN — Medication 25 ML: at 10:42:00

## 2024-12-24 RX ADMIN — PROTAMINE SULFATE 50 MG: 10 INJECTION, SOLUTION INTRAVENOUS at 10:23:00

## 2024-12-24 RX ADMIN — PROTAMINE SULFATE 50 MG: 10 INJECTION, SOLUTION INTRAVENOUS at 10:22:00

## 2024-12-24 RX ADMIN — METHYLPREDNISOLONE SODIUM SUCCINATE 500 MG: 500 INJECTION INTRAMUSCULAR; INTRAVENOUS at 07:45:00

## 2024-12-24 RX ADMIN — PHENYLEPHRINE HCL 100 MCG: 10 MG/ML VIAL (ML) INJECTION at 07:28:00

## 2024-12-24 RX ADMIN — SODIUM CHLORIDE: 9 INJECTION, SOLUTION INTRAVENOUS at 07:19:00

## 2024-12-24 RX ADMIN — PHENYLEPHRINE HCL 200 MCG: 10 MG/ML VIAL (ML) INJECTION at 09:06:00

## 2024-12-24 RX ADMIN — ROCURONIUM BROMIDE 100 MG: 10 INJECTION, SOLUTION INTRAVENOUS at 09:06:00

## 2024-12-24 RX ADMIN — PROTAMINE SULFATE 50 MG: 10 INJECTION, SOLUTION INTRAVENOUS at 10:24:00

## 2024-12-24 RX ADMIN — DOBUTAMINE HYDROCHLORIDE 3 MCG/KG/MIN: 200 INJECTION INTRAVENOUS at 10:27:00

## 2024-12-24 RX ADMIN — DIPHENHYDRAMINE HYDROCHLORIDE 50 MG: 50 INJECTION INTRAMUSCULAR; INTRAVENOUS at 07:19:00

## 2024-12-24 RX ADMIN — PHENYLEPHRINE HCL 100 MCG: 10 MG/ML VIAL (ML) INJECTION at 08:54:00

## 2024-12-24 RX ADMIN — PHENYLEPHRINE HCL 100 MCG: 10 MG/ML VIAL (ML) INJECTION at 08:03:00

## 2024-12-24 RX ADMIN — MIDAZOLAM HYDROCHLORIDE 4 MG: 1 INJECTION INTRAMUSCULAR; INTRAVENOUS at 09:06:00

## 2024-12-24 RX ADMIN — PHENYLEPHRINE HCL 100 MCG: 10 MG/ML VIAL (ML) INJECTION at 08:52:00

## 2024-12-24 RX ADMIN — HEPARIN SODIUM 22000 UNITS: 1000 INJECTION, SOLUTION INTRAVENOUS; SUBCUTANEOUS at 09:01:00

## 2024-12-24 RX ADMIN — HEPARIN SODIUM 5000 UNITS: 1000 INJECTION, SOLUTION INTRAVENOUS; SUBCUTANEOUS at 08:47:00

## 2024-12-24 RX ADMIN — MIDAZOLAM HYDROCHLORIDE 2 MG: 1 INJECTION INTRAMUSCULAR; INTRAVENOUS at 07:23:00

## 2024-12-24 RX ADMIN — ROCURONIUM BROMIDE 100 MG: 10 INJECTION, SOLUTION INTRAVENOUS at 07:23:00

## 2024-12-24 RX ADMIN — Medication 25 ML: at 10:41:00

## 2024-12-24 RX ADMIN — FAMOTIDINE 20 MG: 10 INJECTION, SOLUTION INTRAVENOUS at 07:19:00

## 2024-12-24 RX ADMIN — DOBUTAMINE HYDROCHLORIDE 5 MCG/KG/MIN: 200 INJECTION INTRAVENOUS at 10:12:00

## 2024-12-24 RX ADMIN — PROTAMINE SULFATE 50 MG: 10 INJECTION, SOLUTION INTRAVENOUS at 10:19:00

## 2024-12-24 RX ADMIN — PROTAMINE SULFATE 50 MG: 10 INJECTION, SOLUTION INTRAVENOUS at 10:21:00

## 2024-12-24 RX ADMIN — ALBUTEROL SULFATE 4 PUFF: 90 INHALANT RESPIRATORY (INHALATION) at 10:21:00

## 2024-12-24 NOTE — ANESTHESIA PROCEDURE NOTES
Airway  Date/Time: 12/24/2024 7:25 AM  Urgency: elective    Airway not difficult    General Information and Staff    Patient location during procedure: OR  Anesthesiologist: Chaitanya Garces MD  Performed: anesthesiologist   Performed by: Chaitanya Garces MD  Authorized by: Chaitanya Garces MD      Indications and Patient Condition  Indications for airway management: anesthesia  Sedation level: deep  Preoxygenated: yes  Patient position: sniffing  Mask difficulty assessment: 1 - vent by mask    Final Airway Details  Final airway type: endotracheal airway      Successful airway: ETT  Cuffed: yes   Successful intubation technique: direct laryngoscopy  Endotracheal tube insertion site: oral  Blade: Selene  Blade size: #3  ETT size (mm): 8.0    Cormack-Lehane Classification: grade I - full view of glottis  Placement verified by: capnometry   Measured from: lips  ETT to lips (cm): 22  Number of attempts at approach: 1

## 2024-12-24 NOTE — PROGRESS NOTES
[facility]   part of [facility]     Hospitalist Progress Note     Drew Ozuna Patient Status:  Inpatient    1975 MRN [id number]   Location [facility] 6NE-A Attending Marcelino Madrid*   Hosp Day # 6 PCP PHYSICIAN NONSTAFF     Chief Complaint: chest pain    Subjective:     Patient intubated and sedated.    Objective:    Review of Systems:   Intubated and sedated    Vital signs:  Temp:  [95.3 °F (35.2 °C)-98.7 °F (37.1 °C)] 97.6 °F (36.4 °C)  Pulse:  [] 118  Resp:  [14-20] 17  BP: ()/(67-97) 99/67  SpO2:  [90 %-98 %] 96 %  AO: ()/(50-67) 95/57  FiO2 (%):  [50 %-60 %] 50 %    Physical Exam:    General: Intubated and sedated  Respiratory: Mechanical breath sounds  Cardiovascular: S1, S2, regular rate and rhythm  Abdomen: Soft, Non-tender, non-distended, positive bowel sounds  Neuro: Intubated and sedated  Extremities: No edema    Diagnostic Data:    Labs:  Recent Labs   Lab 24  0826 24  2324 24  0726 24  0529 24  0618 24  0346 24  1035 24  1126   WBC 5.0 5.6  --   --   --  6.2  --  13.4*   HGB 14.5 13.8  --   --   --  13.7  --  13.5   MCV 90.0 91.0  --   --   --  89.6  --  88.8   .0 217.0   < > 229.0 235.0 225.0 170.0 165.0   INR  --   --   --   --   --   --  1.41* 1.31*    < > = values in this interval not displayed.       Recent Labs   Lab 24  0826 24  0451 24  0930 24  0346 24  0640 24  1126   GLU 90   < > 89 98  --  189*   BUN 11   < > 12 12  --  11   CREATSERUM 0.90   < > 0.92 1.02  --  0.94   CA 9.6   < > 9.4 9.6  --  9.2   ALB 4.6  --   --   --   --   --       < > 140 140  --  141   K 3.7   < > 3.8 3.8 3.7 3.4*      < > 107 106  --  107   CO2 26.0   < > 26.0 27.0  --  23.0   ALKPHO 63  --   --   --   --   --    AST 31  --   --   --   --   --    ALT 40  --   --   --   --   --    BILT 1.1  --   --   --   --   --    TP 7.5  --   --   --   --   --     < > =  values in this interval not displayed.       Estimated Glomerular Filtration Rate: 99.4 mL/min/1.73m2 (by CKD-EPI based on SCr of 0.94 mg/dL).    Recent Labs   Lab 12/18/24  0826 12/18/24  1020   TROPHS <3 <3       Recent Labs   Lab 12/24/24  1035 12/24/24  1126   PTP 17.4* 16.4*   INR 1.41* 1.31*                  Microbiology    No results found for this visit on 12/18/24.      Imaging: Reviewed in Epic.    Medications:    chlorhexidine gluconate  15 mL Mouth/Throat BID@0800,2000    [START ON 12/25/2024] metoprolol tartrate  12.5 mg Oral 2x Daily(Beta Blocker)    [START ON 12/25/2024] aspirin  81 mg Oral Daily    [START ON 12/25/2024] sennosides  8.6 mg Oral BID    [START ON 12/25/2024] docusate sodium  100 mg Oral BID    vancomycin  15 mg/kg Intravenous Q12H    mupirocin  1 Application Nasal BID    pantoprazole  40 mg Intravenous QAM AC    Or    pantoprazole  40 mg Oral QAM AC    ceFAZolin  2 g Intravenous Q8H    atorvastatin  80 mg Oral Nightly       Assessment & Plan:      #Chest pain  Presented with chest pain. Abnormal stress test. S/p LHC following ASA desensitization, MV CAD involving /subtotal occlusion of the proximal LAD. TTE with EF 60-65%.   -S/p CABG x 2 today   -per CV surgery and cardiology   -Vent and pressor management per surgery      #Post-op hyperglycemia   A1c 5.2  -Insulin gtt, algorithm A   -Iss after extubation     #GERD     #Obesity, BMI 30    Brinda Villegas MD    Supplementary Documentation:     Quality:  DVT Mechanical Prophylaxis: MONI hose, SCDs,    DVT Pharmacologic Prophylaxis   Medication   None         DVT Pharmacologic prophylaxis: Aspirin 81 mg      Code Status: Not on file  Decker: Decker catheter in place  Decker Duration (in days): 1  Central line:    ANDRESSA: 12/29/2024    Discharge is dependent on: course  At this point Mr. Ozuna is expected to be discharge to: home    The 21st Century Cures Act makes medical notes like these available to patients in the interest of  transparency. Please be advised this is a medical document. Medical documents are intended to carry relevant information, facts as evident, and the clinical opinion of the practitioner. The medical note is intended as peer to peer communication and may appear blunt or direct. It is written in medical language and may contain abbreviations or verbiage that are unfamiliar.

## 2024-12-24 NOTE — ANESTHESIA POSTPROCEDURE EVALUATION
[facility]    Drew Ozuna Patient Status:  Inpatient   Age/Gender 49 year old male MRN [id number]   Location [facility] 6NE-A Attending Marcelino Madrid*   Hosp Day # 6 PCP PHYSICIAN NONSTAFF       Anesthesia Post-op Note    CORONARY ARTERY BYPASS GRAFT X2 USING LEFT INTERNAL MAMMARY ARTERY AND LEFT SAPHENOUS ENDOVEIN; PLACEMENT OF STERNAL PLATES; INTRAOPERATIVE TRANSESOPHAGEAL ECHOCARDIOGRAM    Procedure Summary       Date: 12/24/24 Room / Location:  [facility] 02 /  CVOR    Anesthesia Start: 0719 Anesthesia Stop: 1122    Procedure: CORONARY ARTERY BYPASS GRAFT X2 USING LEFT INTERNAL MAMMARY ARTERY AND LEFT SAPHENOUS ENDOVEIN; PLACEMENT OF STERNAL PLATES; INTRAOPERATIVE TRANSESOPHAGEAL ECHOCARDIOGRAM Diagnosis: (coronary artery disease)    Surgeons: Randell Massey MD Anesthesiologist: Chaitanya aGrces MD    Anesthesia Type: general ASA Status: 3            Anesthesia Type: general    Vitals Value Taken Time   /65 12/24/24 1122   Temp 97 12/24/24 1122   Pulse 115 12/24/24 1121   Resp 14 12/24/24 1121   SpO2 96 % 12/24/24 1121   Vitals shown include unfiled device data.    Patient Location: ICU    Anesthesia Type: general    Airway Patency: intubated    Postop Pain Control: Other: (Intubated and sedated)    Mental Status: Other: (Intubated and sedated)    Nausea/Vomiting: Other: (Intubated and sedated)    Cardiopulmonary/Hydration status: stable euvolemic    Complications: no apparent anesthesia related complications    Postop vital signs: stable    Dental Exam: Unchanged from Preop    Sign out given to ICU staff.

## 2024-12-24 NOTE — PROGRESS NOTES
12/24/24 1530   Vent Information   $ RT Standby Charge (per 15 min) 1   Vent Initiation Date 12/24/24   Vent Initiation Time 1140   Ventilation Day(s) 1   Interface Invasive   Vent Type PB   Vent plugged into main power? Yes   Vent -4   Vent Mode VC+   Settings   FiO2 (%) 50 %   Resp Rate (Set) 16   Vt (Set, mL) 500 mL   Waveform Decelerating ramp   PEEP/CPAP (cm H2O) 5 cm H20   Insp Time (sec) 0.9 sec   Insp Rise Time (%) 50 %   Trigger Sensitivity Flow (L/min) 3 L/min   Humidification Heat and moisture exchanger   Spontaneous Breathing Trial   Spontaneous Breathing Trial Complete Y   Is the FiO2 <= 0.5? (titrated for sats 92-94%) Y   Is the PEEP <= 5? Y   Is the RSBI <=104 Y   Is the patient off pressor and narcotic / sedation drips? Y   Is the patient free of ventricular arrhythmias in the past 24 hours? Y   Is the patient's cough adequate? Y   Is the patient alert (neuro), able to follow commands? Y   Daily Screen Meets All Criteria Yes   Spontaneous Parameters   Spontaneous RR Rate 16   Spontaneous Minute Volume 9.8   Average Spontaneous Tidal Volume 633   $ Spontaneous Vital Capacity 1.415   Negative Inspiratory Force -20   Total RSBI 82   Weaning Trials   Compassionate wean? Yes   Spontaneous Breathing Trial Time Initiated 1540   Spontaneous Breathing Trial Duration 30   Spontaneous Breathing Trial Method PS/PEEP   Spontaneous Breathing Trial Settings PS 5/ PEEP 5/ 50%   Pre Trial    Pre Trial RR 16   Pre Trial SpO2 96 %   Pre Trial /60   Pre Trial Vt 663   Post Trial    Post Trial RR 17   Post Trial SpO2 95 %   Post Trial /64   Post Trial Vt 604     Patient did excellent on his SBT trial. Patient was extubated.

## 2024-12-24 NOTE — ANESTHESIA PROCEDURE NOTES
Central Line    Date/Time: 12/24/2024 7:31 AM    Performed by: Chaitanya Garces MD  Authorized by: Chaitanya Garces MD    General Information and Staff    Procedure Start:  12/24/2024 7:31 AM  Procedure End:  12/24/2024 7:40 AM  Anesthesiologist:  Chaitanya Garces MD  Performed by:  Anesthesiologist  Patient Location:  OR  Indication: central venous access and CVP monitoring    Site Identification: real time ultrasound guided    Preanesthetic Checklist: 2 patient identifiers, IV checked, risks and benefits discussed, monitors and equipment checked, pre-op evaluation, timeout performed, anesthesia consent and sterile technique used    Procedure Detail    Patient Position:  Trendelenburg  Laterality:  Right  Site:  Internal jugular  Prep:  Chloraprep  Catheter Size:  9 Fr  Catheter Type:  MAC introducer  Number of Lumens:  Double lumen  Procedure Detail: target vein identified, needle advanced into vein and blood aspirated and guidewire advanced into vein    Seldinger Technique?: Yes    Intravenous Verification: verified by ultrasound and venous blood return    Post Insertion: all ports aspirated, all ports flushed easily, guidewire was removed intact, line was sutured in place and dressing was applied      Assessment    Events: patient tolerated procedure well with no complications      PA Catheter Placement    PA Catheter Placed?: Yes    PA Catheter Type:  Oximetric  PA Catheter Size:  8  Laterality:  Right  Site:  Internal jugular  Placement Confirmation: pressure tracing changes and verified by AURELIO    PA Catheter Depth (cm):  50  Events: patient tolerated procedure well with no complications      Additional Comments

## 2024-12-24 NOTE — ANESTHESIA PROCEDURE NOTES
Procedure Performed: AURELIO       Start Time:  12/24/2024 8:00 AM       End Time:   12/24/2024 11:00 AM    Preanesthesia Checklist:  Patient identified, IV assessed, risks and benefits discussed, monitors and equipment assessed, procedure being performed at surgeon's request and anesthesia consent obtained.    General Procedure Information  Diagnostic Indications for Echo:  assessment of ascending aorta and hemodynamic monitoring  Physician Requesting Echo: Randell Massey MD  Location performed:  OR  Intubated  Bite block placed  Heart visualized  Probe Insertion:  Easy  Probe Type:  Multiplane  Modalities:  2D only, color flow mapping, pulse wave Doppler and continuous wave Doppler    Echocardiographic and Doppler Measurements    Ventricles    Right Ventricle:  Cavity size normal.  Hypertrophy not present.  Thrombus not present.  Global function normal.    Left Ventricle:  Cavity size normal.  Hypertrophy not present.  Thrombus not present.  Global Function normal.  Ejection Fraction 55%.      Ventricular Regional Function:  1- Basal Anteroseptal:  normal  2- Basal Anterior:  normal  3- Basal Anterolateral:  normal  4- Basal Inferolateral:  normal  5- Basal Inferior:  normal  6- Basal Inferoseptal:  normal  7- Mid Anteroseptal:  normal  8- Mid Anterior:  normal  9- Mid Anterolateral:  normal  10- Mid Inferolateral:  normal  11- Mid Inferior:  normal  12- Mid Inferoseptal:  normal  13- Apical Anterior:  normal  14- Apical Lateral:  normal  15- Apical Inferior:  normal  16- Apical Septal:  normal  17- Boyce:  normal      Valves    Aortic Valve:  Annulus normal.  Stenosis not present.  Regurgitation absent.  Leaflets normal.  Leaflet motions normal.      Mitral Valve:  Annulus normal.  Regurgitation +1.  Leaflets normal.  Leaflet motions normal.      Tricuspid Valve:  Annulus normal.  Stenosis not present.  Regurgitation +1.  Leaflets normal.  Leaflet motions normal.    Pulmonic Valve:  Annulus normal.         Aorta    Ascending Aorta:  Size normal.  Dissection not present.  Plaque thickness less than 3 mm.  Mobile plaque not present.    Aortic Arch:  Size normal.  Dissection not present.  Plaque thickness less than 3 mm.  Mobile plaque not present.    Descending Aorta:  Size normal.  Dissection not present.  Plaque thickness less than 3 mm.  Mobile plaque not present.        Atria    Right Atrium:  Size normal.  Spontaneous echo contrast not present.  Thrombus not present.  Tumor not present.  Device present.    Left Atrium:  Size normal.  Spontaneous echo contrast not present.  Thrombus not present.  Tumor not present.  Device not present.    Left atrial appendage normal.      Septa    Atrial Septum:  Intra-atrial septal morphology normal.      Ventricular Septum:  Intra-ventricular septum morphology normal.          Other Findings  Pericardium:  normal  Pleural Effusion:  none  Pulmonary Arteries:  normal  Pulmonary Venous Flow:  normal    Anesthesia Information  Performed Personally  Anesthesiologist:  Chaitanya Garces MD      Post    Ventricular FXN:  Global FXN: Unchanged   Regional FXN: Unchanged  Valve FXN:  Native Valve:No change            Comments: No aortic dissection    Complications:None

## 2024-12-24 NOTE — DIETARY NOTE
Clinical Nutrition    Dietitian consult received per cardiac rehab standing order. Pt to be educated by cardiac rehab staff and encouraged to attend outpatient classes taught by RD. RD available PRN.    Edemlira Lamas MS, RD, LDN  Clinical Dietitian  Ext: [phone]

## 2024-12-24 NOTE — PROGRESS NOTES
Anesthesia Ventilator Management    Patient is s/p 2V CABG  POD#0.  Patient is currently sedated and intubated.  Vent settings: PRVC 550/14/60/5  ABG, CXR pending    @ 3  Epi@ 2  Prop@ 25  Dex@ 0.5    Will wean FiO2 as tolerated.  Plan for extubation after CPAP trial.

## 2024-12-24 NOTE — PROGRESS NOTES
12/24/24 1313   Vent Information   Is this patient on Chronic Ventilation? No   Vent Initiation Date 12/24/24   Ventilation Day(s) 1   Interface Invasive   Vent Type PB   Vent plugged into main power? Yes   Vent  4   Vent Mode VC+   Settings   FiO2 (%) 60 %   Resp Rate (Set) 16   Vt (Set, mL) 550 mL   Waveform Decelerating ramp   PEEP/CPAP (cm H2O) 5 cm H20   Insp Time (sec) 0.9 sec   Insp Rise Time (%) 50 %   Trigger Sensitivity Flow (L/min) 3 L/min   Humidification Heat and moisture exchanger   Readings   Total RR 16   Minute Ventilation (L/min) 9.7 L/min   Inspiratory Tidal Volume 561 mL   Expiratory Tidal Volume 602 mL   PIP Observed (cm H2O) 23 cm H2O   MAP (cm H2O) 9.8   I/E Ratio 1:3.2   Alarms   High RR 40   Insp Pressure High (cm H2O) 40 cm H2O   Insp Pressure Low (cm H2O) 11 cm H2O   MV High (L/min) 20 L/min   MV Low (L/min) 4 L/min   Apnea Interval (sec) 20 seconds   Apnea Rate 14   Apnea Volume (mL) 550 mL     Received patient from OR and placed on full support.  Blood gas drawn per protocol and changes made accordingly per Dr Garces.  Current vent settings as above.  Will attempt to wean once patient is awake and able to follow commands.  Will cont to monitor closely.

## 2024-12-24 NOTE — PLAN OF CARE
Patient received alert and oriented x 4,spouse at the bedside. Up  SBA. On RA, but was placed on 2L via NC for comfort. NSR on tele. Continent of bowel and bladder. No complaints of pain, shortness of breath or chest pain/discomfort. Heparin gtt infusing. POC : CABG, heparin gtt. Fall precautions in place. Call   light within reach.    2200: Pt showered with CHG, EKG leads placed in the back.    0500: Pt bathe with CHG cloth    Problem: Patient/Family Goals  Goal: Patient/Family Long Term Goal  Description: Patient's Long Term Goal: To stay out of the hospital    Interventions:  - Take all medications as prescribed  - Attend all follow up appointments  - See additional Care Plan goals for specific interventions  Outcome: Progressing  Goal: Patient/Family Short Term Goal  Description: Patient's Short Term Goal: To discharge home    Interventions:   - Cath 12/19  - Cardiology to follow up  - See additional Care Plan goals for specific interventions  Outcome: Progressing     Problem: CARDIOVASCULAR - ADULT  Goal: Maintains optimal cardiac output and hemodynamic stability  Description: INTERVENTIONS:  - Monitor vital signs, rhythm, and trends  - Monitor for bleeding, hypotension and signs of decreased cardiac output  - Evaluate effectiveness of vasoactive medications to optimize hemodynamic stability  - Monitor arterial and/or venous puncture sites for bleeding and/or hematoma  - Assess quality of pulses, skin color and temperature  - Assess for signs of decreased coronary artery perfusion - ex. Angina  - Evaluate fluid balance, assess for edema, trend weights  Outcome: Progressing  Goal: Absence of cardiac arrhythmias or at baseline  Description: INTERVENTIONS:  - Continuous cardiac monitoring, monitor vital signs, obtain 12 lead EKG if indicated  - Evaluate effectiveness of antiarrhythmic and heart rate control medications as ordered  - Initiate emergency measures for life threatening arrhythmias  - Monitor  electrolytes and administer replacement therapy as ordered  Outcome: Progressing     Problem: PAIN - ADULT  Goal: Verbalizes/displays adequate comfort level or patient's stated pain goal  Description: INTERVENTIONS:  - Encourage pt to monitor pain and request assistance  - Assess pain using appropriate pain scale  - Administer analgesics based on type and severity of pain and evaluate response  - Implement non-pharmacological measures as appropriate and evaluate response  - Consider cultural and social influences on pain and pain management  - Manage/alleviate anxiety  - Utilize distraction and/or relaxation techniques  - Monitor for opioid side effects  - Notify MD/LIP if interventions unsuccessful or patient reports new pain  - Anticipate increased pain with activity and pre-medicate as appropriate  Outcome: Progressing

## 2024-12-24 NOTE — PROGRESS NOTES
[facility]   part of [facility]     Cardiology Progress Note        Drew Ozuna Patient Status:  Inpatient    1975 MRN [id number]   Location [facility] 6NE-A Attending Marcelino Madrid*   Hosp Day # 6 PCP PHYSICIAN NONSTAFF         Subjective/ROS:  Intubated, sedated    Objective:  /71 (BP Location: Right arm)   Pulse 116   Temp (!) 95.3 °F (35.2 °C) (Pulmonary Artery)   Resp 16   Ht 5' 7.99\" (1.727 m)   Wt 195 lb 1.7 oz (88.5 kg)   SpO2 98%   BMI 29.67 kg/m²     Temp (24hrs), Av.4 °F (36.3 °C), Min:95.3 °F (35.2 °C), Max:98.7 °F (37.1 °C)      Tele  Sr/st    Intake/Output:    Intake/Output Summary (Last 24 hours) at 2024 1206  Last data filed at 2024 1122  Gross per 24 hour   Intake 2799.17 ml   Output 2625 ml   Net 174.17 ml       Patient Weight for the past 72 hrs:   Weight   24 0421 195 lb 15.8 oz (88.9 kg)   24 0602 195 lb 1.7 oz (88.5 kg)        Physical Exam:    Gen: intubated, sedated   Heent: ett   Neck: no jvd. RIJ swan   Cardiac: regular rate and rhythm, normal S1,S2 + rub  Lungs: clear anteriorly on vent   Abd: soft, flat  Ext: no edema  Skin: Warm, dry  Neuro: sedated    Labs:  Lab Results   Component Value Date    WBC 13.4 2024    HGB 13.5 2024    HCT 37.3 2024    .0 2024    CREATSERUM 0.94 2024    BUN 11 2024     2024    K 3.4 2024     2024    CO2 23.0 2024     2024    CA 9.2 2024    PTT 26.4 2024    INR 1.31 2024    MG 2.3 2024       CXR: pending     Medications:     chlorhexidine gluconate  15 mL Mouth/Throat BID@0800,2000    [START ON 2024] metoprolol tartrate  12.5 mg Oral 2x Daily(Beta Blocker)    [START ON 2024] aspirin  81 mg Oral Daily    [START ON 2024] sennosides  8.6 mg Oral BID    [START ON 2024] docusate sodium  100 mg Oral BID    vancomycin  15 mg/kg Intravenous  Q12H    mupirocin  1 Application Nasal BID    pantoprazole  40 mg Intravenous QAM AC    Or    pantoprazole  40 mg Oral QAM AC    ceFAZolin  2 g Intravenous Q8H    atorvastatin  80 mg Oral Nightly      insulin regular 4 Units/hr (24 1124)    propofol 25 mcg/kg/min (24 1128)    dexmedetomidine 0.5 mcg/kg/hr (24 1128)    sodium chloride      DOBUTamine 3 mcg/kg/min (24 1129)    nitroGLYCERIN in dextrose 5%      norepinephrine      NitroPRUSSide (Nipride) 50 mg in dextrose 5% 250 mL infusion      dextrose 5%-sodium chloride 0.45% 70 mL/hr (24 1130)    sodium chloride 10 mL/hr at 24 1130    HYDROmorphone in sodium chloride 0.9%      EPINEPHrine (Adrenalin) 5 mg in sodium chloride 0.9% 250 mL infusion         Assessment:    MV cad s/p cabg x 2 (llima to lad, svg to om)- pod 0- ef 65%  Hemodynamically stable on current support (dobutamine, epi). CO/Ci 6.9/3.4  PAP /  Avo2 82%  EKG sinus tach without acute injury pattern  Good uop   cr 0.94    CT op scant. Hgb 13.5 , plts 165k  Cxr pending   S/p successful asa desensitization     Plan:     Routine post op ccu recovery  Wean gtts as able   Will review cxr for swan placement when available  Wean to extubation later today pending clinical course    Cardiology attending:  Pt seen and independently examined.  Note edited.  Plan of care performed by myself in its entirety.  Looks good so far.  Making urine.  Sinus rhythm.  Low dose 's and epi.  Wake up and extubate as able.    Drew Crooks MD  L3    Discussed plan of care with nursing.       Leslie Mujica, ELIAN  [phone]

## 2024-12-24 NOTE — ANESTHESIA PROCEDURE NOTES
Arterial Line    Date/Time: 12/24/2024 7:26 AM    Performed by: Chaitanya Garces MD  Authorized by: Chaitanya Garces MD    General Information and Staff    Procedure Start:  12/24/2024 7:26 AM  Procedure End:  12/24/2024 7:30 AM  Anesthesiologist:  Chaitanya Garces MD  Performed By:  Anesthesiologist  Patient Location:  OR  Indication: continuous blood pressure monitoring and blood sampling needed    Site Identification: real time ultrasound guided and surface landmarks    Preanesthetic Checklist: 2 patient identifiers, IV checked, risks and benefits discussed, monitors and equipment checked, pre-op evaluation, timeout performed, anesthesia consent and sterile technique used    Procedure Details    Catheter Size:  20 G  Catheter Length:  1 and 3/4 inch  Catheter Type:  Arrow  Seldinger Technique?: Yes    Laterality:  Left  Site:  Radial artery  Site Prep: chlorhexidine    Line Secured:  Tape and Tegaderm    Assessment    Events: patient tolerated procedure well with no complications      Medications  12/24/2024 7:26 AM      Additional Comments

## 2024-12-25 ENCOUNTER — APPOINTMENT (OUTPATIENT)
Dept: GENERAL RADIOLOGY | Facility: HOSPITAL | Age: 49
End: 2024-12-25
Attending: PHYSICIAN ASSISTANT
Payer: OTHER GOVERNMENT

## 2024-12-25 ENCOUNTER — APPOINTMENT (OUTPATIENT)
Dept: GENERAL RADIOLOGY | Facility: HOSPITAL | Age: 49
End: 2024-12-25
Attending: HOSPITALIST
Payer: OTHER GOVERNMENT

## 2024-12-25 PROBLEM — Z95.1: Status: ACTIVE | Noted: 2024-12-25

## 2024-12-25 PROBLEM — Z95.1 STATUS POST THREE VESSEL CORONARY ARTERY BYPASS: Status: RESOLVED | Noted: 2024-12-25 | Resolved: 2024-12-25

## 2024-12-25 PROBLEM — Z88.6 HISTORY OF ALLERGY TO ASPIRIN: Chronic | Status: ACTIVE | Noted: 2024-12-25

## 2024-12-25 PROBLEM — I25.110 CORONARY ARTERY DISEASE INVOLVING NATIVE CORONARY ARTERY OF NATIVE HEART WITH UNSTABLE ANGINA PECTORIS (HCC): Status: ACTIVE | Noted: 2024-12-25

## 2024-12-25 PROBLEM — E78.00 PURE HYPERCHOLESTEROLEMIA: Chronic | Status: ACTIVE | Noted: 2024-12-25

## 2024-12-25 PROBLEM — Z95.1 STATUS POST THREE VESSEL CORONARY ARTERY BYPASS: Status: ACTIVE | Noted: 2024-12-25

## 2024-12-25 LAB
ATRIAL RATE: 115 BPM
BASOPHILS # BLD AUTO: 0.01 X10(3) UL (ref 0–0.2)
BASOPHILS NFR BLD AUTO: 0.1 %
BUN BLD-MCNC: 15 MG/DL (ref 9–23)
CALCIUM BLD-MCNC: 8.6 MG/DL (ref 8.7–10.4)
CHLORIDE SERPL-SCNC: 109 MMOL/L (ref 98–112)
CO2 SERPL-SCNC: 28 MMOL/L (ref 21–32)
CREAT BLD-MCNC: 0.95 MG/DL
EGFRCR SERPLBLD CKD-EPI 2021: 98 ML/MIN/1.73M2 (ref 60–?)
EOSINOPHIL # BLD AUTO: 0 X10(3) UL (ref 0–0.7)
EOSINOPHIL NFR BLD AUTO: 0 %
ERYTHROCYTE [DISTWIDTH] IN BLOOD BY AUTOMATED COUNT: 12 %
GLUCOSE BLD-MCNC: 105 MG/DL (ref 70–99)
GLUCOSE BLD-MCNC: 105 MG/DL (ref 70–99)
GLUCOSE BLD-MCNC: 106 MG/DL (ref 70–99)
GLUCOSE BLD-MCNC: 113 MG/DL (ref 70–99)
GLUCOSE BLD-MCNC: 114 MG/DL (ref 70–99)
GLUCOSE BLD-MCNC: 115 MG/DL (ref 70–99)
GLUCOSE BLD-MCNC: 115 MG/DL (ref 70–99)
GLUCOSE BLD-MCNC: 120 MG/DL (ref 70–99)
GLUCOSE BLD-MCNC: 122 MG/DL (ref 70–99)
GLUCOSE BLD-MCNC: 124 MG/DL (ref 70–99)
GLUCOSE BLD-MCNC: 132 MG/DL (ref 70–99)
GLUCOSE BLD-MCNC: 132 MG/DL (ref 70–99)
GLUCOSE BLD-MCNC: 136 MG/DL (ref 70–99)
GLUCOSE BLD-MCNC: 144 MG/DL (ref 70–99)
GLUCOSE BLD-MCNC: 150 MG/DL (ref 70–99)
GLUCOSE BLD-MCNC: 99 MG/DL (ref 70–99)
HCT VFR BLD AUTO: 29 %
HGB BLD-MCNC: 10.1 G/DL
IMM GRANULOCYTES # BLD AUTO: 0.04 X10(3) UL (ref 0–1)
IMM GRANULOCYTES NFR BLD: 0.3 %
LYMPHOCYTES # BLD AUTO: 0.58 X10(3) UL (ref 1–4)
LYMPHOCYTES NFR BLD AUTO: 4.7 %
MAGNESIUM SERPL-MCNC: 1.8 MG/DL (ref 1.6–2.6)
MCH RBC QN AUTO: 32.1 PG (ref 26–34)
MCHC RBC AUTO-ENTMCNC: 34.8 G/DL (ref 31–37)
MCV RBC AUTO: 92.1 FL
MONOCYTES # BLD AUTO: 0.99 X10(3) UL (ref 0.1–1)
MONOCYTES NFR BLD AUTO: 8 %
NEUTROPHILS # BLD AUTO: 10.71 X10 (3) UL (ref 1.5–7.7)
NEUTROPHILS # BLD AUTO: 10.71 X10(3) UL (ref 1.5–7.7)
NEUTROPHILS NFR BLD AUTO: 86.9 %
P AXIS: 52 DEGREES
P-R INTERVAL: 134 MS
PLATELET # BLD AUTO: 168 10(3)UL (ref 150–450)
POTASSIUM SERPL-SCNC: 4.1 MMOL/L (ref 3.5–5.1)
Q-T INTERVAL: 376 MS
QRS DURATION: 108 MS
QTC CALCULATION (BEZET): 520 MS
R AXIS: 26 DEGREES
RBC # BLD AUTO: 3.15 X10(6)UL
SODIUM SERPL-SCNC: 142 MMOL/L (ref 136–145)
T AXIS: 27 DEGREES
VENTRICULAR RATE: 115 BPM
WBC # BLD AUTO: 12.3 X10(3) UL (ref 4–11)

## 2024-12-25 PROCEDURE — 71045 X-RAY EXAM CHEST 1 VIEW: CPT | Performed by: HOSPITALIST

## 2024-12-25 PROCEDURE — 99232 SBSQ HOSP IP/OBS MODERATE 35: CPT | Performed by: INTERNAL MEDICINE

## 2024-12-25 PROCEDURE — 71045 X-RAY EXAM CHEST 1 VIEW: CPT | Performed by: PHYSICIAN ASSISTANT

## 2024-12-25 RX ORDER — FUROSEMIDE 10 MG/ML
40 INJECTION INTRAMUSCULAR; INTRAVENOUS ONCE
Status: COMPLETED | OUTPATIENT
Start: 2024-12-25 | End: 2024-12-25

## 2024-12-25 RX ORDER — MAGNESIUM OXIDE 400 MG/1
400 TABLET ORAL ONCE
Status: COMPLETED | OUTPATIENT
Start: 2024-12-25 | End: 2024-12-25

## 2024-12-25 RX ORDER — FUROSEMIDE 10 MG/ML
40 INJECTION INTRAMUSCULAR; INTRAVENOUS ONCE
Status: COMPLETED | OUTPATIENT
Start: 2024-12-26 | End: 2024-12-26

## 2024-12-25 NOTE — PLAN OF CARE
Received from O.R., intubated and vented, dobs, epi, insulin, propofol and precedex infusions. Recovery per protocol. Waking up and following commands. CPAP trial done w/weaning parameters done, results called to , order received to extubate.  Extubated to nasal cannula, oriented, following commands. Instructed on use of dilaudid pca. IV acetaminophen also given. Dropping sbp to low 80's, albumin given and  notified. Orders received. When albumin infusing, sbp in 90-low 100's. Plan of care updated w/pt and family.

## 2024-12-25 NOTE — PLAN OF CARE
Assumed care at 1930.  AOx4.   IV Tylenol and Dilaudid PCA for pain management.    Current gtts:  Dobutamine at 0.5mcg/kg/min.  Insulin.    SVO2 >68  CO between 6-7  CO between 2-3  Chest tube drains 5-15cc/hr. Total of 115cc on night shift including 15cc dump when pt up in chair this morning.  Decker catheter drains 30-60cc/hr.    Keep SBP>100. 3L O2 nasal cannula.   Plan of care discussed with pt and spouse at bedside.

## 2024-12-25 NOTE — PROGRESS NOTES
[facility] Cardiology  Progress Note    Drew Ozuna Patient Status:  Inpatient    1975 MRN [id number]   [facility] 6NE-A Attending Marcelino Madrid*   Hosp Day # 7 PCP PHYSICIAN NONSTAFF     Subjective:  POD #1 CABG.  No chest pain.  Sitting up in chair.            Intake/Output:    Intake/Output Summary (Last 24 hours) at 2024 0756  Last data filed at 2024 0724  Gross per 24 hour   Intake 4559.17 ml   Output 2600 ml   Net 1959.17 ml       Wt Readings from Last 6 Encounters:   24 205 lb 14.6 oz (93.4 kg)   24 205 lb (93 kg)   10/14/23 210 lb (95.3 kg)   19 200 lb (90.7 kg)             Physical Exam:  Blood pressure 101/74, pulse 96, temperature 98.9 °F (37.2 °C), temperature source Temporal, resp. rate 14, height 5' 7.99\" (1.727 m), weight 205 lb 14.6 oz (93.4 kg), SpO2 97%.CO 7, CI 3.6.  UO and  \"OK\"  General: Alert, in no apparent distress.  HEENT: No scleral icterus.  MMM.  Neck: No JVD.  Cardiac: Regular S1, S2, no murmur.  + rub.  Lungs: Clear.    Abdomen: Soft, non-tender.   Extremities: Without clubbing, cyanosis.  Diffuse edema.   Neurologic: No focal defecits.  Skin: No rashes. Incisions c,d,I.    Laboratory/Data:  Diagnostics:   EKG: NSR.          CXR: My review.  Lines OK, no PTX.    Labs:  Lab Results   Component Value Date    WBC 12.3 2024    HGB 10.1 2024    HCT 29.0 2024    .0 2024     Lab Results   Component Value Date     2024    K 4.1 2024     2024    CO2 28.0 2024    BUN 15 2024    CREATSERUM 0.95 2024     Recent Labs   Lab 24  1035 24  1126   PTP 17.4* 16.4*   INR 1.41* 1.31*         Medications:  Reviewed.    Assessment and Plan:  Principal Problem:    Status post two vessel coronary artery bypass  Active Problems:    Coronary artery disease involving native coronary artery of native heart with unstable angina pectoris (HCC)    History of  allergy to aspirin    Pure hypercholesterolemia      POD #1 CABG.  Doing well early post op.  Lines out.    Continue diuresis.    Resume cardiac medications as able, increase activity.  Reinforced with pt and wife need to take asa every day to maintain desensitization.  D/W nursing and Dr Massey at bedside.    Efren Aleman MD  12/25/2024  7:56 AM  3

## 2024-12-25 NOTE — PROGRESS NOTES
[facility]   part of [facility]     Hospitalist Progress Note     Drew Ozuna Patient Status:  Inpatient    1975 MRN [id number]   Location [facility] 6NE-A Attending Marcelino Madrid*   Hosp Day # 7 PCP PHYSICIAN NONSTAFF     Chief Complaint: chest pain    Subjective:     Feels SOB.     Objective:    Review of Systems:   4 point ROS negative    Vital signs:  Temp:  [95.3 °F (35.2 °C)-99.7 °F (37.6 °C)] 99.5 °F (37.5 °C)  Pulse:  [] 95  Resp:  [14-27] 15  BP: ()/(56-75) 108/68  SpO2:  [94 %-98 %] 97 %  AO: ()/(50-71) 127/67  FiO2 (%):  [50 %-60 %] 50 %    Physical Exam:    General: Awake.   Respiratory: Clear anteriorly  Cardiovascular: S1, S2, regular rate and rhythm  Abdomen: Soft, Non-tender, non-distended, positive bowel sounds  Neuro: No facial asymmetry. Moving all 4 extremities. Speech is intact.   Extremities: No edema    Diagnostic Data:    Labs:  Recent Labs   Lab 24  2324 24  0726 24  0618 24  0346 24  1035 24  1126 24  0402   WBC 5.6  --   --  6.2  --  13.4* 12.3*   HGB 13.8  --   --  13.7  --  13.5 10.1*   MCV 91.0  --   --  89.6  --  88.8 92.1   .0   < > 235.0 225.0 170.0 165.0 168.0   INR  --   --   --   --  1.41* 1.31*  --     < > = values in this interval not displayed.       Recent Labs   Lab 24  0346 24  0640 24  1126 24  0402   GLU 98  --  189* 106*   BUN 12  --  11 15   CREATSERUM 1.02  --  0.94 0.95   CA 9.6  --  9.2 8.6*     --  141 142   K 3.8 3.7 3.4* 4.1     --  107 109   CO2 27.0  --  23.0 28.0       Estimated Glomerular Filtration Rate: 98.1 mL/min/1.73m2 (by CKD-EPI based on SCr of 0.95 mg/dL).    Recent Labs   Lab 24  1020   TROPHS <3       Recent Labs   Lab 24  1035 24  1126   PTP 17.4* 16.4*   INR 1.41* 1.31*                  Microbiology    No results found for this visit on 24.      Imaging: Reviewed in  Epic.    Medications:    insulin aspart  1-5 Units Subcutaneous TID AC and HS    furosemide  40 mg Intravenous Once    [START ON 12/26/2024] furosemide  40 mg Intravenous Once    chlorhexidine gluconate  15 mL Mouth/Throat BID@0800,2000    metoprolol tartrate  12.5 mg Oral 2x Daily(Beta Blocker)    aspirin  81 mg Oral Daily    sennosides  8.6 mg Oral BID    docusate sodium  100 mg Oral BID    vancomycin  15 mg/kg Intravenous Q12H    mupirocin  1 Application Nasal BID    pantoprazole  40 mg Intravenous QAM AC    Or    pantoprazole  40 mg Oral QAM AC    ceFAZolin  2 g Intravenous Q8H    atorvastatin  80 mg Oral Nightly       Assessment & Plan:      #Chest pain  #CAD s/p CABG  Presented with chest pain. Abnormal stress test. S/p LHC following ASA desensitization, MV CAD involving /subtotal occlusion of the proximal LAD. TTE with EF 60-65%.   -S/p CABG x 2 12/24  -per CV surgery and cardiology      #Post-op hyperglycemia   A1c 5.2  -Iss     #Leukocytosis - mild trend    #Expected post-op anemia - trend    #GERD     #Obesity, BMI 30    Brinda Villegas MD    Supplementary Documentation:     Quality:  DVT Mechanical Prophylaxis: MONI hose, CHANDRIKAs,    DVT Pharmacologic Prophylaxis   Medication   None         DVT Pharmacologic prophylaxis: Aspirin 81 mg      Code Status: Not on file  Decker: Decker catheter in place  Decker Duration (in days): 1  Central line:    ANDRESSA:     Discharge is dependent on: course  At this point Mr. Ozuna is expected to be discharge to: home    The 21st Century Cures Act makes medical notes like these available to patients in the interest of transparency. Please be advised this is a medical document. Medical documents are intended to carry relevant information, facts as evident, and the clinical opinion of the practitioner. The medical note is intended as peer to peer communication and may appear blunt or direct. It is written in medical language and may contain abbreviations or verbiage that are  unfamiliar.

## 2024-12-25 NOTE — PROGRESS NOTES
[facility]   [facility] Progress Note    Drew Ozuna Patient Status:  Inpatient    1975 MRN [id number]   Location [facility] 6NE-A Attending Marcelino Madrid*   Hosp Day # 7 PCP PHYSICIAN NONSTAFF     Subjective:  Doing well this am . Pain controlled with PCA . No nausea .     Objective:  /68 (BP Location: Right arm)   Pulse 95   Temp 99.5 °F (37.5 °C) (Pulmonary Artery)   Resp 15   Ht 172.7 cm (5' 7.99\")   Wt 93.4 kg (205 lb 14.6 oz)   SpO2 97%   BMI 31.32 kg/m²   PAP: (16-)/(10-) 24/15  CO:  [3 L/min-8.1 L/min] 5.7 L/min  CI:  [2.8 L/min/m2-4 L/min/m2] 2.8 L/min/m2      Intake/Output:    Intake/Output Summary (Last 24 hours) at 2024 1029  Last data filed at 2024 0812  Gross per 24 hour   Intake 3661.37 ml   Output 2170 ml   Net 1491.37 ml       Chest Tube Output: 125 over night no leak     Last 3 Weights   24 0500 93.4 kg (205 lb 14.6 oz)   24 0602 88.5 kg (195 lb 1.7 oz)   24 0421 88.9 kg (195 lb 15.8 oz)   24 0000 89.8 kg (198 lb)   24 0000 89.5 kg (197 lb 5 oz)   24 1213 90 kg (198 lb 6.6 oz)   24 1132 90 kg (198 lb 6.6 oz)   24 1100 90 kg (198 lb 6.6 oz)   24 0740 93 kg (205 lb)   24 1706 93 kg (205 lb)   10/14/23 1129 95.3 kg (210 lb)           Allergies:  Allergies[1]    Current Facility-Administered Medications   Medication Dose Route Frequency    insulin aspart (NovoLOG) 100 Units/mL FlexPen 1-5 Units  1-5 Units Subcutaneous TID AC and HS    furosemide (Lasix) 10 mg/mL injection 40 mg  40 mg Intravenous Once    [START ON 2024] furosemide (Lasix) 10 mg/mL injection 40 mg  40 mg Intravenous Once    glucose (Dex4) 15 GM/59ML oral liquid 15 g  15 g Oral Q15 Min PRN    Or    glucose (Glutose) 40% oral gel 15 g  15 g Oral Q15 Min PRN    Or    glucose-vitamin C (Dex-4) chewable tab 4 tablet  4 tablet Oral Q15 Min PRN    Or    dextrose 50% injection 50 mL  50 mL Intravenous Q15 Min PRN    Or     glucose (Dex4) 15 GM/59ML oral liquid 30 g  30 g Oral Q15 Min PRN    Or    glucose (Glutose) 40% oral gel 30 g  30 g Oral Q15 Min PRN    Or    glucose-vitamin C (Dex-4) chewable tab 8 tablet  8 tablet Oral Q15 Min PRN    insulin regular human (Novolin R, Humulin R) 100 Units in sodium chloride 0.9% 100 mL standard infusion (100 mL)  1-57 Units/hr Intravenous Continuous    midazolam (Versed) 2 MG/2ML injection 1 mg  1 mg Intravenous Q30 Min PRN    propofol (Diprivan) 10 mg/mL infusion premix  5-50 mcg/kg/min (Dosing Weight) Intravenous Continuous    chlorhexidine gluconate (Peridex) 0.12 % oral solution 15 mL  15 mL Mouth/Throat BID@0800,2000    ipratropium-albuterol (Duoneb) 0.5-2.5 (3) MG/3ML inhalation solution 3 mL  3 mL Nebulization Q4H PRN    dexmedeTOMIDine in sodium chloride 0.9% (Precedex) 400 mcg/100mL infusion premix  0.2-1.5 mcg/kg/hr (Dosing Weight) Intravenous Continuous    metoprolol tartrate (Lopressor) partial tab 12.5 mg  12.5 mg Oral 2x Daily(Beta Blocker)    aspirin DR tab 81 mg  81 mg Oral Daily    sodium chloride 0.9% infusion   Intravenous Continuous    acetaminophen (Ofirmev) 10 mg/mL infusion premix 1,000 mg  1,000 mg Intravenous Q6H PRN    melatonin tab 3 mg  3 mg Oral Nightly PRN    sennosides (Senokot) tab 8.6 mg  8.6 mg Oral BID    docusate sodium (Colace) cap 100 mg  100 mg Oral BID    polyethylene glycol (PEG 3350) (Miralax) 17 g oral packet 17 g  17 g Oral Daily PRN    bisacodyl (Dulcolax) 10 MG rectal suppository 10 mg  10 mg Rectal Daily PRN    ondansetron (Zofran) 4 MG/2ML injection 4 mg  4 mg Intravenous Q6H PRN    vancomycin (Vancocin) 1.25 g in sodium chloride 0.9% 250mL IVPB premix  15 mg/kg Intravenous Q12H    albumin human (Albumin) 5% injection 12.5 g  12.5 g Intravenous Once PRN    DOBUTamine in dextrose 5% (Dobutrex) 500 mg/250mL infusion premix  2.5-20 mcg/kg/min (Dosing Weight) Intravenous Continuous PRN    nitroGLYCERIN in dextrose 5% 50 mg/250mL infusion premix   5-300 mcg/min Intravenous Continuous PRN    norepinephrine (Levophed) 4 mg/250mL infusion premix  0.5-30 mcg/min Intravenous Continuous PRN    NitroPRUSSide (Nipride) 50 mg in dextrose 5% 250 mL infusion  0.1-4 mcg/kg/min (Dosing Weight) Intravenous Continuous PRN    potassium chloride 20 mEq/100mL IVPB premix 20 mEq  20 mEq Intravenous PRN    Or    potassium chloride 40 mEq/100mL IVPB premix (central line) 40 mEq  40 mEq Intravenous PRN    calcium gluconate 3 g in sodium chloride 0.9% 100 mL IVPB  3 g Intravenous PRN    magnesium sulfate in dextrose 5% 1 g/100mL infusion premix 1 g  1 g Intravenous PRN    magnesium sulfate in sterile water for injection 2 g/50mL IVPB premix 2 g  2 g Intravenous PRN    mupirocin (Bactroban) 2% nasal ointment 1 Application  1 Application Nasal BID    dextrose 5%-sodium chloride 0.45% infusion   mL/hr Intravenous Continuous    morphINE PF 2 MG/ML injection 2 mg  2 mg Intravenous Q2H PRN    Or    morphINE PF 4 MG/ML injection 4 mg  4 mg Intravenous Q2H PRN    pantoprazole (Protonix) 40 mg in sodium chloride 0.9% PF 10 mL IV push  40 mg Intravenous QAM AC    Or    pantoprazole (Protonix) DR tab 40 mg  40 mg Oral QAM AC    ceFAZolin (Ancef) 2g in 10mL IV syringe premix  2 g Intravenous Q8H    sodium chloride 0.9% infusion   Intravenous Continuous    HYDROmorphone in sodium chloride 0.9% (Dilaudid) 20 mg/100mL PCA premix   Intravenous Continuous    naloxone (Narcan) 0.4 MG/ML injection 0.08 mg  0.08 mg Intravenous Q5 Min PRN    diphenhydrAMINE (Benadryl) 50 mg/mL  injection 12.5 mg  12.5 mg Intravenous Q4H PRN    EPINEPHrine (Adrenalin) 5 mg in sodium chloride 0.9% 250 mL infusion  1-10 mcg/min Intravenous Continuous    atorvastatin (Lipitor) tab 80 mg  80 mg Oral Nightly    ALPRAZolam (Xanax) tab 0.25 mg  0.25 mg Oral TID PRN    glycerin-hypromellose- (Artificial Tears) 0.2-0.2-1 % ophthalmic solution 1 drop  1 drop Both Eyes QID PRN    sodium chloride (Saline Mist) 0.65 %  nasal solution 1 spray  1 spray Each Nare Q3H PRN       Labs:  Lab Results   Component Value Date    WBC 12.3 12/25/2024    HGB 10.1 12/25/2024    HCT 29.0 12/25/2024    .0 12/25/2024    CREATSERUM 0.95 12/25/2024    BUN 15 12/25/2024     12/25/2024    K 4.1 12/25/2024     12/25/2024    CO2 28.0 12/25/2024     12/25/2024    CA 8.6 12/25/2024       Chest Xray:   Narrative   PROCEDURE:  XR CHEST AP PORTABLE  (CPT=71045)     TECHNIQUE:  AP chest radiograph was obtained.     COMPARISON:  [facility] , XR, XR CHEST AP PORTABLE  (CPT=71045), 12/24/2024, 12:25 PM.     IND ICATIONS:  S/P Surgery               PATIENT STATED HISTORY: (As transcribed by Technologist)  Patient offered no additional history at this time.         FINDINGS:  There is a right internal jugular Carolina-Alex catheter with the tip in the proximal right main pulmonary artery.  Sternotomy wires and plates are noted consistent previous CABG.  There is stable cardiomegaly.  Mediastinal drain and left-sided  chest tube again identified.  Improving atelectasis at the right lung base noted.  Persistent atelectatic changes are seen in the left mid lower lung field.  There is no large effusion or pneumothorax.  Stable appearance of the superior mediastinum which   appears wide.                      Impression   CONCLUSION:  Status post cardiac surgery.  Improving atelectasis at the right lung base.  Persistent atelectasis at the left lung base.  No pneumothorax.          Physical Exam:    Neuro: NAD intact A/O X4   Lungs: Clear diminished base   Heart: RRR S1  sternum stable   Abdomen: Soft non tender BS present no nausea   Extremities: Warm and dry trace edema   Pulses: palpable   Incisions: Sternotomy steri C/D/I LLE        Assessment/Plan:  Patient Active Problem List   Diagnosis    Acute chest pain    Coronary artery disease involving native coronary artery of native heart with unstable angina pectoris (HCC)    Status post two vessel  coronary artery bypass    History of allergy to aspirin    Pure hypercholesterolemia       POD# 1 CABG X2 ( LIMA to LAD, SVG to OM     - HD stable off gtts   - Cardiology following BB ASA statin EF 65%  - Leukocytosis most likely reactive follow   - Post op anemia expected stable follow   - Keep PW   - Ok to remove Tama cordis chest tubes and keatnig   - Renal function intact adequate UOP   - Pain management dilaudid PCA   - GI PPX protonix   - DVT PPX MONI'S / SCD'S   - Bowel regimen   - Encourage IS/ Ambulation   - PT/OT/ Cardiac rehab   - Keep in CCU     D/W Dr.Foy Elsie BAKER, RN  12/25/2024  10:29 AM        [1]   Allergies  Allergen Reactions    Asa [Aspirin] SWELLING     Now post desensitization on 12/20

## 2024-12-26 LAB
ANION GAP SERPL CALC-SCNC: 4 MMOL/L (ref 0–18)
ATRIAL RATE: 89 BPM
BASE EXCESS BLDA CALC-SCNC: -3 MMOL/L (ref ?–30)
BLOOD TYPE BARCODE: 6200
BUN BLD-MCNC: 13 MG/DL (ref 9–23)
CA-I BLDA-SCNC: 1.34 MMOL/L (ref 1.12–1.32)
CALCIUM BLD-MCNC: 9.1 MG/DL (ref 8.7–10.4)
CHLORIDE SERPL-SCNC: 103 MMOL/L (ref 98–112)
CO2 BLDA-SCNC: 24 MMOL/L (ref 22–32)
CO2 SERPL-SCNC: 33 MMOL/L (ref 21–32)
CREAT BLD-MCNC: 0.91 MG/DL
EGFRCR SERPLBLD CKD-EPI 2021: 103 ML/MIN/1.73M2 (ref 60–?)
GLUCOSE BLD-MCNC: 118 MG/DL (ref 70–99)
GLUCOSE BLD-MCNC: 122 MG/DL (ref 70–99)
GLUCOSE BLDA-MCNC: 160 MG/DL (ref 70–99)
HCO3 BLDA-SCNC: 22.7 MEQ/L (ref 22–26)
HCT VFR BLDA CALC: 33 %
MAGNESIUM SERPL-MCNC: 1.9 MG/DL (ref 1.6–2.6)
OSMOLALITY SERPL CALC.SUM OF ELEC: 291 MOSM/KG (ref 275–295)
P AXIS: 28 DEGREES
P-R INTERVAL: 144 MS
PCO2 BLDA: 43.4 MMHG (ref 35–45)
PH BLDA: 7.33 [PH] (ref 7.35–7.45)
PO2 BLDA: 188 MMHG (ref 80–105)
POTASSIUM SERPL-SCNC: 4.6 MMOL/L (ref 3.5–5.1)
Q-T INTERVAL: 370 MS
QRS DURATION: 94 MS
QTC CALCULATION (BEZET): 450 MS
R AXIS: 11 DEGREES
SAO2 % BLDA: 100 % (ref 92–100)
SODIUM BLDA-SCNC: 134 MMOL/L (ref 136–145)
SODIUM BLDA-SCNC: 4.5 MMOL/L (ref 3.6–5.1)
SODIUM SERPL-SCNC: 140 MMOL/L (ref 136–145)
T AXIS: 19 DEGREES
UNIT VOLUME: 350 ML
VENTRICULAR RATE: 89 BPM

## 2024-12-26 PROCEDURE — 99232 SBSQ HOSP IP/OBS MODERATE 35: CPT | Performed by: INTERNAL MEDICINE

## 2024-12-26 RX ORDER — HYDROCODONE BITARTRATE AND ACETAMINOPHEN 5; 325 MG/1; MG/1
2 TABLET ORAL EVERY 4 HOURS PRN
Status: DISCONTINUED | OUTPATIENT
Start: 2024-12-26 | End: 2024-12-27

## 2024-12-26 RX ORDER — HYDROCODONE BITARTRATE AND ACETAMINOPHEN 5; 325 MG/1; MG/1
1 TABLET ORAL EVERY 4 HOURS PRN
Status: DISCONTINUED | OUTPATIENT
Start: 2024-12-26 | End: 2024-12-27

## 2024-12-26 RX ORDER — FUROSEMIDE 10 MG/ML
40 INJECTION INTRAMUSCULAR; INTRAVENOUS DAILY
Status: DISCONTINUED | OUTPATIENT
Start: 2024-12-26 | End: 2024-12-27

## 2024-12-26 RX ORDER — ACETAMINOPHEN 325 MG/1
650 TABLET ORAL EVERY 6 HOURS PRN
Status: DISCONTINUED | OUTPATIENT
Start: 2024-12-26 | End: 2024-12-27

## 2024-12-26 RX ORDER — METOPROLOL TARTRATE 25 MG/1
25 TABLET, FILM COATED ORAL
Status: DISCONTINUED | OUTPATIENT
Start: 2024-12-26 | End: 2024-12-27

## 2024-12-26 NOTE — DIETARY NOTE
[facility]   part of [facility]   CLINICAL NUTRITION    Drew Ozuna     Admitting diagnosis:  Acute chest pain [R07.9]    Ht: 172.7 cm (5' 7.99\")  Wt: 91.9 kg (202 lb 9.6 oz).   Body mass index is 30.81 kg/m².    Wt Readings from Last 6 Encounters:   12/26/24 91.9 kg (202 lb 9.6 oz)   12/05/24 93 kg (205 lb)   10/14/23 95.3 kg (210 lb)   11/08/19 90.7 kg (200 lb)      Labs/Meds reviewed    Diet:       Procedures    Regular/General diet Fluid Consistency: Thin Liquids ; Texture Consistency: Regular; Is Patient on Accuchecks? Yes; Is Patient on Suicide Precautions? No; Misc Restriction: Cardiac     Percent Meals Eaten (last 3 days)       Date/Time Percent Meals Eaten (%)    12/23/24 1234 100 %    12/25/24 1000 25 %    12/25/24 1513 50 %          Pt chart reviewed d/t LOS. S/p CABG 12/24. RD adding ONS to max protein intakes.   Patient with good appetite at this time.  Nursing notes reports Percent Meals Eaten (%): 50 % intake for last meal.  Tolerating po diet without diarrhea, emesis, or constipation. BM12/26  No significant weight changes noted.     Patient is at low nutrition risk at this time.    Please consult if patient status changes or nutrition issues arise.    Tamara Marino RD, LDN  Clinical Dietitian

## 2024-12-26 NOTE — PHYSICAL THERAPY NOTE
PHYSICAL THERAPY EVALUATION - INPATIENT     Room Number: 6610/6610-A  Evaluation Date: 12/26/2024  Type of Evaluation: Initial  Physician Order: PT Eval and Treat       Co-Morbidities : GERD  Reason for Therapy: Mobility Dysfunction and Discharge Planning    PHYSICAL THERAPY ASSESSMENT   Patient is a 49 year old male admitted 12/18/2024 for chest pain.  Prior to admission, patient's baseline is indep.  Patient is currently functioning below baseline with bed mobility, transfers, gait, and stair negotiation.  Patient is requiring supervision and contact guard assist as a result of the following impairments: decreased functional strength, decreased endurance/aerobic capacity, pain, decreased muscular endurance, and medical status.  Physical Therapy will continue to follow for duration of hospitalization.    Patient will benefit from continued skilled PT Services for duration of hospitalization, however, given the patient is functioning near baseline level do not anticipate skilled therapy needs at discharge .    PLAN DURING HOSPITALIZATION  Nursing Mobility Recommendation : 1 Assist  PT Device Recommendation: Rolling walker  PT Treatment Plan: Bed mobility;Body mechanics;Coordination;Endurance;Energy conservation;Patient education;Family education;Gait training;Neuromuscular re-educate;Range of motion;Strengthening;Stoop training;Stair training;Transfer training;Balance training  Rehab Potential : Good  Frequency (Obs): 3-5x/week     CURRENT GOALS    Goal #1 Patient is able to demonstrate supine - sit EOB @ level: supervision     Goal #2 Patient is able to demonstrate transfers EOB to/from Chair/Wheelchair at assistance level: supervision     Goal #3 Patient is able to ambulate 150 feet with assist device: none at assistance level: supervision     Goal #4 Patient will navigate stairs with rail and SBA   Goal #5 Patient will participate in CV binder HEP   Goal #6    Goal Comments: Goals established on  2024      PHYSICAL THERAPY MEDICAL/SOCIAL HISTORY  History related to current admission: Patient is a 49 year old male admitted on 2024: Presented with chest pain dx  severe multivessel coronary artery disease s/p Coronary revascularization x2:  Left internal mammary artery graft to the left anterior descending, saphenous vein graft to the obtuse marginal.     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     SUBJECTIVE  \"Will he be able to work again or does he need to switch careers?\" - pts family concerns addressed    OBJECTIVE  Precautions: Sternal;Cardiac  Fall Risk: Standard fall risk    WEIGHT BEARING RESTRICTION     PAIN ASSESSMENT  Ratin  Location: sternal  Management Techniques: Activity promotion;Body mechanics;Repositioning    COGNITION  Overall Cognitive Status:  WFL - within functional limits    RANGE OF MOTION AND STRENGTH ASSESSMENT  Upper extremity ROM and strength are within functional limits     Lower extremity ROM is within functional limits     Lower extremity strength is within functional limits     BALANCE  Static Sitting: Good  Dynamic Sitting: Good  Static Standing: Fair -  Dynamic Standing: Fair -    ADDITIONAL TESTS                                    ACTIVITY TOLERANCE                         O2 WALK       NEUROLOGICAL FINDINGS                        AM-PAC '6-Clicks' INPATIENT SHORT FORM - BASIC MOBILITY  How much difficulty does the patient currently have...  Patient Difficulty: Turning over in bed (including adjusting bedclothes, sheets and blankets)?: None   Patient Difficulty: Sitting down on and standing up from a chair with arms (e.g., wheelchair, bedside commode, etc.): A Little   Patient Difficulty: Moving from lying on back to  sitting on the side of the bed?: None   How much help from another person does the patient currently need...   Help from Another: Moving to and from a bed to a chair (including a wheelchair)?: A Little   Help from Another: Need to walk in hospital room?: A Little   Help from Another: Climbing 3-5 steps with a railing?: A Little     AM-PAC Score:  Raw Score: 20   Approx Degree of Impairment: 35.83%   Standardized Score (AM-PAC Scale): 47.67   CMS Modifier (G-Code): CJ    FUNCTIONAL ABILITY STATUS  Gait Assessment   Functional Mobility/Gait Assessment  Gait Assistance: Supervision  Distance (ft): 100  Assistive Device: Rolling walker  Pattern: Within Functional Limits    Skilled Therapy Provided     Bed Mobility:  Rolling: mod I   Supine to sit: mod I    Sit to supine: min A      Transfer Mobility:  Sit to stand: CGA   Stand to sit: CGA  Gait = SBA    Therapist's Comments: Discussed case with RN prior to session initiation. Pt agreeable to participation in therapy. Gait belt donned for out of bed mobility. Pt educated on role of therapy throughout stay, post op precautions with cuing for appropriate body mechanics during bed mobility (log roll technique) and UE placement during STS transfer to RW. Pt participated in gait training with RW. Educated on EC and pacing techniques and importance of continued ambulation for recovery per post op protocol. Pt tolerated session well HR 90s with activity.       Exercise/Education Provided:  Bed mobility  Body mechanics  Energy conservation  Functional activity tolerated  Gait training  Transfer training    Patient End of Session: In bed;Needs met;Call light within reach;RN aware of session/findings;All patient questions and concerns addressed;Hospital anti-slip socks;SCDs in place;Alarm set      Patient Evaluation Complexity Level:  History Low - no personal factors and/or co-morbidities   Examination of body systems Low -  addressing 1-2 elements   Clinical Presentation Low-  Stable   Clinical Decision Making Low Complexity       PT Session Time: 20 minutes  Gait Training:  minutes  Therapeutic Activity: 10 minutes  Neuromuscular Re-education:  minutes  Therapeutic Exercise:  minutes

## 2024-12-26 NOTE — OCCUPATIONAL THERAPY NOTE
OCCUPATIONAL THERAPY EVALUATION - INPATIENT     Room Number: 8618/8618-A  Evaluation Date: 12/26/2024  Type of Evaluation: Initial  Presenting Problem: 12/24: CABG    Physician Order: IP Consult to Occupational Therapy  Reason for Therapy: ADL/IADL Dysfunction and Discharge Planning    OCCUPATIONAL THERAPY ASSESSMENT   Patient is currently functioning near baseline with toileting, bathing, lower body dressing, bed mobility, transfers, energy conservation strategies, and performing household tasks. Prior to admission, patient's baseline is independent.  Patient is requiring minimum assistance as a result of the following impairments: decreased functional reach, pain, and medical status. Occupational Therapy will continue to follow for duration of hospitalization.    Patient will benefit from continued skilled OT Services with no additional skilled services but increased support at home    History Related to Current Admission: Patient is a 49 year old male admitted on 12/18/2024 with Presenting Problem: 12/24: CABG. Co-Morbidities : GERD    WEIGHT BEARING RESTRICTION       Recommendations for nursing staff:   Transfers: x1 with walker  Toileting location: bathroom    EVALUATION SESSION:  Patient Start of Session: bed    FUNCTIONAL TRANSFER ASSESSMENT  Sit to Stand: Edge of Bed  Edge of Bed: Stand-by Assist  Toilet Transfer: Stand-by Assist    BED MOBILITY  Supine to Sit : Stand-by Assist  Sit to Supine (OT): Stand-by Assist    BALANCE ASSESSMENT  Static Sitting: Supervision    FUNCTIONAL ADL ASSESSMENT  Grooming Standing: Stand-by Assist  Toileting Standing: Stand-by Assist    ACTIVITY TOLERANCE: good                         O2 SATURATIONS       COGNITION  Overall Cognitive Status:  WFL - within functional limits    Upper Extremity   ROM: within functional limits   Strength: within functional limits   Coordination  Gross motor:   Fine motor:   Sensation:     EDUCATION PROVIDED  Patient Education : Role of Occupational  Therapy; Plan of Care; Discharge Recommendations; Functional Transfer Techniques; Surgical Precautions; Posture/Positioning; Proper Body Mechanics; Energy Conservation  Patient's Response to Education: Verbalized Understanding    Equipment used: walker  Demonstrates functional use, Would benefit from additional trial      Therapist comments: Educated pt on log roll technique and surgical precautions with functional application to transfers and household mobility. Pt walks to bathroom with SBA and performs toileting SBA in standing    Patient End of Session: In bed;With  staff;Needs met;Call light within reach;RN aware of session/findings    OCCUPATIONAL PROFILE    HOME SITUATION  Type of Home: [facility]  Home Layout: Multi-level  Lives With: Significant other    Toilet and Equipment: Standard height toilet  Shower/Tub and Equipment: Tub-shower combo;Grab bar             Drives: Yes       Prior Level of Function: independent    SUBJECTIVE   Cooperative    PAIN ASSESSMENT  Ratin  Location: surgical  Management Techniques: Activity promotion;Body mechanics    OBJECTIVE  Precautions: Sternal;Cardiac  Fall Risk: Standard fall risk    ASSESSMENTS    AM-PAC ‘6-Clicks’ Inpatient Daily Activity Short Form  -   Putting on and taking off regular lower body clothing?: A Lot  -   Bathing (including washing, rinsing, drying)?: A Lot  -   Toileting, which includes using toilet, bedpan or urinal? : A Little  -   Putting on and taking off regular upper body clothing?: A Little  -   Taking care of personal grooming such as brushing teeth?: None  -   Eating meals?: None    AM-PAC Score:  Score: 18  Approx Degree of Impairment: 46.65%  Standardized Score (AM-PAC Scale): 38.66    ADDITIONAL TESTS     NEUROLOGICAL FINDINGS      COGNITION ASSESSMENTS     PLAN  OT Device Recommendations: Shower chair  OT Treatment Plan: Balance activities;Energy conservation/work simplification techniques;ADL training;Functional transfer  training;Patient/Family education;Patient/Family training;Compensatory technique education  Rehab Potential : Good  Frequency: 3x/week  Number of Visits to Meet Established Goals: 1    ADL Goals   Patient will perform upper body dressing:  with supervision  Patient will perform lower body dressing:  with supervision  Patient will perform toileting: with supervision    Functional Transfer Goals  Patient will perform all functional transfers:  with supervision    UE Exercise Program Goal  Patient will be independent with bilateral AROM HEP (home exercise program).    Patient Evaluation Complexity Level:   Occupational Profile/Medical History MODERATE - Expanded review of history including review of medical or therapy record   Specific performance deficits impacting engagement in ADL/IADL MODERATE  3 - 5 performance deficits   Client Assessment/Performance Deficits MODERATE - Comorbidities and min to mod modifications of tasks    Clinical Decision Making MODERATE - Analysis of occupational profile, detailed assessments, several treatment options    Overall Complexity MODERATE     OT Session Time: 25 minutes  Self-Care Home Management: 15 minutes  Therapeutic Activity:  minutes  Neuromuscular Re-education:  minutes  Therapeutic Exercise:  minutes  Cognitive Skills:  minutes  Sensory Integrative:  minutes  Orthotic Management and Training:  minutes  Can add/delete any of these

## 2024-12-26 NOTE — PLAN OF CARE
Assumed pt care this evening. No acute events overnight. VSS on 2LO2 NC. NSR per tele. No ectopy. Denies nausea. Appetite improving. Adequate uop through urinal. Good pain control w/ dilaudid PCA. IS utilization encouraged while awake. Mid-sternal/LLE incision sites C/D/I. Pt updated on POC.    Problem: Patient/Family Goals  Goal: Patient/Family Long Term Goal  Description: Patient's Long Term Goal: To stay out of the hospital    Interventions:  - Take all medications as prescribed  - Attend all follow up appointments  - See additional Care Plan goals for specific interventions  Outcome: Progressing  Goal: Patient/Family Short Term Goal  Description: Patient's Short Term Goal: To discharge home    Interventions:   - Cath 12/19  - Cardiology to follow up  - See additional Care Plan goals for specific interventions  Outcome: Progressing     Problem: CARDIOVASCULAR - ADULT  Goal: Maintains optimal cardiac output and hemodynamic stability  Description: INTERVENTIONS:  - Monitor vital signs, rhythm, and trends  - Monitor for bleeding, hypotension and signs of decreased cardiac output  - Evaluate effectiveness of vasoactive medications to optimize hemodynamic stability  - Monitor arterial and/or venous puncture sites for bleeding and/or hematoma  - Assess quality of pulses, skin color and temperature  - Assess for signs of decreased coronary artery perfusion - ex. Angina  - Evaluate fluid balance, assess for edema, trend weights  Outcome: Progressing  Goal: Absence of cardiac arrhythmias or at baseline  Description: INTERVENTIONS:  - Continuous cardiac monitoring, monitor vital signs, obtain 12 lead EKG if indicated  - Evaluate effectiveness of antiarrhythmic and heart rate control medications as ordered  - Initiate emergency measures for life threatening arrhythmias  - Monitor electrolytes and administer replacement therapy as ordered  Outcome: Progressing     Problem: PAIN - ADULT  Goal: Verbalizes/displays adequate  comfort level or patient's stated pain goal  Description: INTERVENTIONS:  - Encourage pt to monitor pain and request assistance  - Assess pain using appropriate pain scale  - Administer analgesics based on type and severity of pain and evaluate response  - Implement non-pharmacological measures as appropriate and evaluate response  - Consider cultural and social influences on pain and pain management  - Manage/alleviate anxiety  - Utilize distraction and/or relaxation techniques  - Monitor for opioid side effects  - Notify MD/LIP if interventions unsuccessful or patient reports new pain  - Anticipate increased pain with activity and pre-medicate as appropriate  Outcome: Progressing

## 2024-12-26 NOTE — CM/SW NOTE
Met with patient, accompanied by significant other Master,  to discuss discharge planning now that patient has had CABG surgery with Dr Massey on 12/24/2025. Patient, who goes by Soham, works in the production of beer. He and his significant other Master have been together for nine years.  They reside in their town house in [place] with their 14 year old daughter.  The town house has 2 levels--basement with full bath on the lower level--6 steps and 6 steps to the second floor where the bedrooms are located.  Prior to admission, patient independent and drove, using no assistive equipment or respiratory DME.  [facility] Dr Marin; [facility]    Talked about the focus post surgery--pain is the main focus--Soham is using his splint pillow and IS and shared that they just stopped the PCA and he is feeling \"pretty good\"  our second goal is his safety--explained how therapies will continue to work with him to customize a plan for him so that he will be safe with stairs.  Discussed the role of HHC--provided AIDIN list for patient and significant other to review and select.  Lastly, talked about the importance of ensuring that the  \"plumbing is working\"--patient shared he has had several BM's.  Stressed the importance of the lifting restriction  discharge as well as not being alone @ discharge.  All questions answered and addressed--CM/SW to continue to follow for any additional needs   12/26/24 1100   CM/SW Referral Data   Referral Source Physician;   Reason for Referral Discharge planning;Protocol order set   Specify order set CABG   Informant Patient;Spouse/Significant Other   Medical Hx   Does patient have an established PCP? Yes   Patient Info   Patient's Current Mental Status at Time of Assessment Alert;Oriented   Patient's Home Environment [facility]   Number of Levels in Home 2   Number of Stair in Home 6 steps to basement with full bath, and 6 steps to bedrooms on  the second floor   Patient lives with Spouse/Significant other;Daughter   Patient Status Prior to Admission   Independent with ADLs and Mobility Yes   Discharge Needs   Anticipated D/C needs Home health care

## 2024-12-26 NOTE — PLAN OF CARE
Assumed care of patient at 0730. POD1 CABGx2. Insulin gtt DC'ed, switched to SSI QID per hospitalist. Dobs DC'ed, SBP maintained within parameters. De-lined and chest tube discontinued, CXR post-chest tube removal stable. Up to chair with meals. Ambulated in halls x1 with minimal assist. First BB dose in evening, SBP stable > 100 but c/o dizziness and feeling \"faint.\" HR stable in 90's NSR w/ RBBB. Advanced to regular cardiac diet. Tolerating dilaudid PCA and PRN ofirmev IVPBs. Some nausea at end of day, improved with PO food intake and decrease in use of dilaudid PCA. Pt and spouse updated throughout the day on active ICU issues, postoperative course and plan of care. All questions answered and concerns addressed.    Macy Ferrell, BSN, RN, CCRN, CMC, CSC

## 2024-12-26 NOTE — PROGRESS NOTES
[facility]   part of [facility]     Hospitalist Progress Note     Drew Ozuna Patient Status:  Inpatient    1975 MRN [id number]   Location [facility] 6NE-A Attending Marcelino Madrid*   Hosp Day # 8 PCP PHYSICIAN NONSTAFF     Chief Complaint: chest pain    Subjective:     Feels weak. Pain controlled.     Objective:    Review of Systems:   4 point ROS negative    Vital signs:  Temp:  [97.7 °F (36.5 °C)-98.5 °F (36.9 °C)] 98.5 °F (36.9 °C)  Pulse:  [] 95  Resp:  [11-25] 17  BP: ()/(69-89) 107/77  SpO2:  [92 %-99 %] 94 %    Physical Exam:    General: Awake.   Respiratory: Clear anteriorly  Cardiovascular: S1, S2, regular rate and rhythm  Abdomen: Soft, Non-tender, non-distended, positive bowel sounds  Neuro: No facial asymmetry. Moving all 4 extremities. Speech is intact.   Extremities: No edema    Diagnostic Data:    Labs:  Recent Labs   Lab 24  2324 24  0726 24  0618 24  0346 24  1035 24  1126 24  0402   WBC 5.6  --   --  6.2  --  13.4* 12.3*   HGB 13.8  --   --  13.7  --  13.5 10.1*   MCV 91.0  --   --  89.6  --  88.8 92.1   .0   < > 235.0 225.0 170.0 165.0 168.0   INR  --   --   --   --  1.41* 1.31*  --     < > = values in this interval not displayed.       Recent Labs   Lab 24  1126 24  0402 24  0448   * 106* 122*   BUN 11 15 13   CREATSERUM 0.94 0.95 0.91   CA 9.2 8.6* 9.1    142 140   K 3.4* 4.1 4.6    109 103   CO2 23.0 28.0 33.0*       Estimated Glomerular Filtration Rate: 103.3 mL/min/1.73m2 (by CKD-EPI based on SCr of 0.91 mg/dL).    No results for input(s): \"TROP\", \"TROPHS\", \"CK\" in the last 168 hours.      Recent Labs   Lab 24  1035 24  1126   PTP 17.4* 16.4*   INR 1.41* 1.31*                  Microbiology    No results found for this visit on 24.      Imaging: Reviewed in Epic.    Medications:    furosemide  40 mg Intravenous Daily    metoprolol  tartrate  25 mg Oral 2x Daily(Beta Blocker)    aspirin  81 mg Oral Daily    sennosides  8.6 mg Oral BID    docusate sodium  100 mg Oral BID    mupirocin  1 Application Nasal BID    pantoprazole  40 mg Intravenous QAM AC    Or    pantoprazole  40 mg Oral QAM AC    atorvastatin  80 mg Oral Nightly       Assessment & Plan:      #Chest pain  #CAD s/p CABG  Presented with chest pain. Abnormal stress test. S/p LHC following ASA desensitization, MV CAD involving /subtotal occlusion of the proximal LAD. TTE with EF 60-65%.   -S/p CABG x 2 12/24  -per CV surgery and cardiology      #Post-op hyperglycemia   A1c 5.2  -Can discontinue ISS    #Metabolic alkalosis - suspect related to diuresis    #Leukocytosis - mild trend    #Expected post-op anemia - trend    #GERD     #Obesity, BMI 30    Brinda Villegas MD    Supplementary Documentation:     Quality:  DVT Mechanical Prophylaxis: MONI hose, Amberly,    DVT Pharmacologic Prophylaxis   Medication   None         DVT Pharmacologic prophylaxis: Aspirin 81 mg      Code Status: Not on file  Decker: No urinary catheter in place  Decker Duration (in days): 1  Central line:    ANDRESSA:     Discharge is dependent on: course  At this point Mr. Ozuna is expected to be discharge to: home    The 21st Century Cures Act makes medical notes like these available to patients in the interest of transparency. Please be advised this is a medical document. Medical documents are intended to carry relevant information, facts as evident, and the clinical opinion of the practitioner. The medical note is intended as peer to peer communication and may appear blunt or direct. It is written in medical language and may contain abbreviations or verbiage that are unfamiliar.

## 2024-12-26 NOTE — PLAN OF CARE
Assumed care of patient this morning. Patient a+ox4. Starkey. Follows commands. Pacer wires removed per orders. Site c/d/I. Ambulating in corral. Steady gait noted. Pca discontinued. Oral pain medication tolerated. Ctu orders. Report called.

## 2024-12-26 NOTE — PROGRESS NOTES
[facility]   part of [facility]     Cardiology Progress Note        Drew Ozuna Patient Status:  Inpatient    1975 MRN [id number]   Location [facility] 6NE-A Attending Marcelino Madrid*   Hosp Day # 8 PCP PHYSICIAN NONSTAFF       Subjective/ROS:  Up in chair eating breakfast. Complains of feeling tired. Breathing without issues. Pain controlled.     Objective:  /84   Pulse 103   Temp 97.8 °F (36.6 °C) (Temporal)   Resp 18   Ht 5' 7.99\" (1.727 m)   Wt 202 lb 9.6 oz (91.9 kg)   SpO2 93%   BMI 30.81 kg/m²     Temp (24hrs), Av.9 °F (36.6 °C), Min:97.7 °F (36.5 °C), Max:98.3 °F (36.8 °C)      Tele  Sinus tach    Intake/Output:    Intake/Output Summary (Last 24 hours) at 2024 0832  Last data filed at 2024 0828  Gross per 24 hour   Intake 1375 ml   Output 3370 ml   Net -1995 ml       Patient Weight for the past 72 hrs:   Weight   24 0602 195 lb 1.7 oz (88.5 kg)   24 0500 205 lb 14.6 oz (93.4 kg)   24 0400 202 lb 9.6 oz (91.9 kg)        Physical Exam:    Gen: alert, oriented x 3, NAD  Heent: pupils equal, reactive. Mucous membranes moist.   Neck: no jvd  Cardiac: regular, mildly tachycardic  normal S1,S2 + rub   Lungs: diminished throughout with shallow insp effort  Abd: soft, NT/ND +bs  Ext: mild generalized edema  Skin: Warm, dry  Neuro: No focal deficits    Labs:  Lab Results   Component Value Date    CREATSERUM 0.91 2024    BUN 13 2024     2024    K 4.6 2024     2024    CO2 33.0 2024     2024    CA 9.1 2024    MG 1.9 2024       CXR: Reviewed. Atelectasis     Medications:     furosemide  40 mg Intravenous Daily    insulin aspart  1-5 Units Subcutaneous TID AC and HS    metoprolol tartrate  12.5 mg Oral 2x Daily(Beta Blocker)    aspirin  81 mg Oral Daily    sennosides  8.6 mg Oral BID    docusate sodium  100 mg Oral BID    mupirocin  1 Application Nasal BID     pantoprazole  40 mg Intravenous QAM AC    Or    pantoprazole  40 mg Oral QAM AC    atorvastatin  80 mg Oral Nightly      sodium chloride Stopped (12/25/24 0800)    HYDROmorphone in sodium chloride 0.9%         Assessment:    Cad s/p cabg x 2 (lima to lad, svg to om) 12/24- ef 65%  Hemodynamically stable   Asa sensitivity s/p successful desensitization 12/19  Volume overload- IV lasix   Post op anemia   HL- , on high dose statin     Plan:     Increase bb to 25 mg bid  IV lasix as ordered  To continue asa uninterrupted post desensitization   Continue to work on IS  Ok to transfer to ctu from cardiac perspective    Discussed plan of care with patient and nursing.       Leslie Mujica, ELIAN  [phone]    I have personally seen and examined patient.   I have independently formulated assessment and plan  I agree with the AP note    Doing well. No CP or pressure  Ambulated in the hallway today. Apart from tiredness, did well    PE:   Decreased breath sounds  RRR, no murmurs    A/P:    CAD s/p CABG x 2 - POD 2  ASA hypersensitivity s/p desensitization  Mild volume overload    Cont with ASA, statin and metoprolol  Agree with IV diuresis  Increase ambulation and encourage IS  Discussed with patient and family    Thank you for the opportunity to participate in the care of your patient.     Aristeo Stovall MD  Interventional Cardiologist  [facility]

## 2024-12-26 NOTE — PROGRESS NOTES
[facility]   part of [facility]     CV Surgery Progress Note    Drew Ozuna Patient Status:  Inpatient    1975 MRN [id number]   Location [facility] 6NE-A Attending Marcelino Madrid*   Hosp Day # 8 PCP PHYSICIAN NONSTAFF     Subjective:  Patient seen and examined by Dr. Massey. Pain is controlled. Denies any dyspnea. Ambulating well.     Tele: SR/ST    Objective:  /77   Pulse 95   Temp 98.5 °F (36.9 °C) (Temporal)   Resp 17   Ht 5' 7.99\" (1.727 m)   Wt 202 lb 9.6 oz (91.9 kg)   SpO2 94%   BMI 30.81 kg/m²     Intake/Output:    Intake/Output Summary (Last 24 hours) at 2024 1156  Last data filed at 2024 1040  Gross per 24 hour   Intake 695 ml   Output 3750 ml   Net -3055 ml       Labs:     Lab Results   Component Value Date     2024    K 4.6 2024     2024    CO2 33.0 2024    BUN 13 2024    CREATSERUM 0.91 2024     2024    CA 9.1 2024     Lab Results   Component Value Date    INR 1.31 (H) 2024    INR 1.41 (H) 2024    INR 0.99 2024         Assessment/Plan:   S/P CABGx2 with LIMA-LAD, SVG-OM POD#2  -HD stable, off support   -Leukocytosis, likely reactive, down trending, afebrile- monitor  -Acute post op blood loss anemia, expected, asymptomatic- monitor  -Mild vol OL, weight down trending- continue lasix   -Renal function intact, good UOP  -Bowel regimen   -Pain management, prn norco   -Chest tubes and PW removed   -GI PPX: protonix   -DVT PPX: TEDs/SCDs  -Encourage IS/ambulation   -PT/OT/Cardiac rehab   -Ok to transfer to CTU     -D/W Dr. Bryson Hilton PAMARIPOSA  Cardiothoracic Surgery   2024  11:56 AM

## 2024-12-26 NOTE — OPERATIVE REPORT
[facility]    PATIENT'S NAME: RAÚL ZAMORA   ATTENDING PHYSICIAN: Marcelino Madrid D.O.   OPERATING PHYSICIAN: Randell Massey MD   PATIENT ACCOUNT#:   [id number]    LOCATION:  [address] [place] [address]  MEDICAL RECORD #:   [id number]       YOB: 1975  ADMISSION DATE:       12/18/2024      OPERATION DATE:  12/24/2024    OPERATIVE REPORT    PREOPERATIVE DIAGNOSIS:  Coronary artery disease.  POSTOPERATIVE DIAGNOSIS:  Coronary artery disease.  PROCEDURE:  Coronary revascularization x2:  Left internal mammary artery graft to the left anterior descending, saphenous vein graft to the obtuse marginal.    ASSISTANT:  Dianna Duarte PA-C    ANESTHESIA:  General endotracheal.    BLOOD LOSS:  600 mL.    COMPLICATIONS:  None.    INDICATIONS:  The patient is a 49-year-old male who enters the hospital for his first cardiac evaluation due to chest pain.  Following stabilization, the patient was taken for catheterization which demonstrates total proximal occlusion of the LAD filling retrograde from the right and the circumflex.  Additionally, in the obtuse marginal branch of the circumflex, there was approximately a 70% to 80% lesion.  Due to the anatomy, the patient was referred for surgical revascularization, which seemed to be the best long-term option for him.  Risks, benefits, and options were discussed in excruciating detail.      OPERATIVE TECHNIQUE:  Appropriate lines and catheters were placed by Dr. Garces.  General anesthesia was induced.  Collins-Alex was placed.  Transesophageal echo was placed.  Transcranial oximetry was placed.  Patient was prepped and draped.  Sternotomy was performed.  Left internal mammary was taken down.  Greater saphenous graft was harvested endoscopically from the left leg.  The patient was heparinized and cannulated for cardiopulmonary bypass.  On bypass, patient was cooled to 32 degrees centigrade.  The aorta was crossclamped.  Blood cardioplegia was infused to achieve  electromechanical arrest of the heart.  Obtuse marginal was bypassed first.  This was a 1.5 mm good quality artery.  Proximally, I could see that it was quite calcified.  Cardioplegia was given, following which the left internal mammary was placed to the LAD in a fairly proximal location of the LAD.  LAD was a smallish artery.  Where I opened it, I could admit a 1.5 mm probe.  There was good-sized correspondence between the mammary and the LAD.  Rewarming was begun while proximal anastomosis for the obtuse graft was constructed end-to-side to the aorta.  Warm cardioplegia was given.  The aorta was unclamped.  Heart spontaneously defibrillated to sinus rhythm.  Following complete and thorough rewarming, patient was weaned from bypass without difficulty.  Pump time 65 minutes.  Crossclamp 49 minutes.  Cardioplegia 1.9 L.  Patient was decannulated.  Protamine was administered.  Pacing leads were applied to the heart.  Chest was closed with double-stranded wire supplemented with two 8-hole titanium plates for further sternal security.  Subcutaneous tissue and skin were closed.  Patient was taken to the ICU in stable condition, having received no blood transfusion.  Patient's family was updated by me regarding the patient's condition and the conduct of the operation.    Dictated By Randell Massey MD  d: 12/25/2024 07:14:01  t: 12/25/2024 10:05:43  Job [id number]  /

## 2024-12-26 NOTE — PLAN OF CARE
Received patient from CNICU at 1500. Patient A&O x4 and on room air, bilat lung sounds clear. Patient voiding without complications, still needs to have good BM post surgery. Patient is reporting no pain at this time and is resting in bed. Fall precautions in place, call light and belongings within reach. Plan of care evolving. Two staff member skin check completed upon transfer.    POC:   - PT for stairs  - pain management   - ambulation    Problem: Patient/Family Goals  Goal: Patient/Family Long Term Goal  Description: Patient's Long Term Goal: To stay out of the hospital    Interventions:  - Take all medications as prescribed  - Attend all follow up appointments  - See additional Care Plan goals for specific interventions  Outcome: Progressing  Goal: Patient/Family Short Term Goal  Description: Patient's Short Term Goal: To discharge home    Interventions:   - Cath 12/19  - Cardiology to follow up  - See additional Care Plan goals for specific interventions  Outcome: Progressing     Problem: CARDIOVASCULAR - ADULT  Goal: Maintains optimal cardiac output and hemodynamic stability  Description: INTERVENTIONS:  - Monitor vital signs, rhythm, and trends  - Monitor for bleeding, hypotension and signs of decreased cardiac output  - Evaluate effectiveness of vasoactive medications to optimize hemodynamic stability  - Monitor arterial and/or venous puncture sites for bleeding and/or hematoma  - Assess quality of pulses, skin color and temperature  - Assess for signs of decreased coronary artery perfusion - ex. Angina  - Evaluate fluid balance, assess for edema, trend weights  Outcome: Progressing  Goal: Absence of cardiac arrhythmias or at baseline  Description: INTERVENTIONS:  - Continuous cardiac monitoring, monitor vital signs, obtain 12 lead EKG if indicated  - Evaluate effectiveness of antiarrhythmic and heart rate control medications as ordered  - Initiate emergency measures for life threatening arrhythmias  -  Monitor electrolytes and administer replacement therapy as ordered  Outcome: Progressing     Problem: PAIN - ADULT  Goal: Verbalizes/displays adequate comfort level or patient's stated pain goal  Description: INTERVENTIONS:  - Encourage pt to monitor pain and request assistance  - Assess pain using appropriate pain scale  - Administer analgesics based on type and severity of pain and evaluate response  - Implement non-pharmacological measures as appropriate and evaluate response  - Consider cultural and social influences on pain and pain management  - Manage/alleviate anxiety  - Utilize distraction and/or relaxation techniques  - Monitor for opioid side effects  - Notify MD/LIP if interventions unsuccessful or patient reports new pain  - Anticipate increased pain with activity and pre-medicate as appropriate  Outcome: Progressing

## 2024-12-27 VITALS
BODY MASS INDEX: 29.9 KG/M2 | HEART RATE: 103 BPM | SYSTOLIC BLOOD PRESSURE: 116 MMHG | HEIGHT: 67.99 IN | TEMPERATURE: 98 F | RESPIRATION RATE: 20 BRPM | DIASTOLIC BLOOD PRESSURE: 80 MMHG | OXYGEN SATURATION: 94 % | WEIGHT: 197.31 LBS

## 2024-12-27 LAB
ANION GAP SERPL CALC-SCNC: 5 MMOL/L (ref 0–18)
BUN BLD-MCNC: 14 MG/DL (ref 9–23)
CALCIUM BLD-MCNC: 8.8 MG/DL (ref 8.7–10.4)
CHLORIDE SERPL-SCNC: 101 MMOL/L (ref 98–112)
CO2 SERPL-SCNC: 33 MMOL/L (ref 21–32)
CREAT BLD-MCNC: 0.87 MG/DL
EGFRCR SERPLBLD CKD-EPI 2021: 106 ML/MIN/1.73M2 (ref 60–?)
ERYTHROCYTE [DISTWIDTH] IN BLOOD BY AUTOMATED COUNT: 12 %
GLUCOSE BLD-MCNC: 121 MG/DL (ref 70–99)
HCT VFR BLD AUTO: 29.3 %
HGB BLD-MCNC: 10 G/DL
MCH RBC QN AUTO: 31.7 PG (ref 26–34)
MCHC RBC AUTO-ENTMCNC: 34.1 G/DL (ref 31–37)
MCV RBC AUTO: 93 FL
OSMOLALITY SERPL CALC.SUM OF ELEC: 290 MOSM/KG (ref 275–295)
PLATELET # BLD AUTO: 145 10(3)UL (ref 150–450)
POTASSIUM SERPL-SCNC: 3.3 MMOL/L (ref 3.5–5.1)
RBC # BLD AUTO: 3.15 X10(6)UL
SODIUM SERPL-SCNC: 139 MMOL/L (ref 136–145)
WBC # BLD AUTO: 8.9 X10(3) UL (ref 4–11)

## 2024-12-27 PROCEDURE — 99239 HOSP IP/OBS DSCHRG MGMT >30: CPT | Performed by: INTERNAL MEDICINE

## 2024-12-27 RX ORDER — HYDROCODONE BITARTRATE AND ACETAMINOPHEN 5; 325 MG/1; MG/1
1 TABLET ORAL EVERY 6 HOURS PRN
Qty: 15 TABLET | Refills: 0 | Status: SHIPPED | OUTPATIENT
Start: 2024-12-27

## 2024-12-27 RX ORDER — ASPIRIN 81 MG/1
81 TABLET ORAL DAILY
Qty: 30 TABLET | Refills: 11 | Status: SHIPPED | OUTPATIENT
Start: 2024-12-28

## 2024-12-27 RX ORDER — POTASSIUM CHLORIDE 1500 MG/1
40 TABLET, EXTENDED RELEASE ORAL EVERY 4 HOURS
Status: COMPLETED | OUTPATIENT
Start: 2024-12-27 | End: 2024-12-27

## 2024-12-27 RX ORDER — METOPROLOL SUCCINATE 50 MG/1
50 TABLET, EXTENDED RELEASE ORAL
Status: DISCONTINUED | OUTPATIENT
Start: 2024-12-28 | End: 2024-12-27

## 2024-12-27 RX ORDER — METOPROLOL SUCCINATE 50 MG/1
50 TABLET, EXTENDED RELEASE ORAL
Qty: 30 TABLET | Refills: 2 | Status: SHIPPED | OUTPATIENT
Start: 2024-12-28

## 2024-12-27 NOTE — DISCHARGE SUMMARY
[facility]PITAL[facility]  DISCHARGE SUMMARY     Drew Ozuna Patient Status:  Inpatient    1975 MRN [id number]   Location [facility] 8NE-A Attending Marcelino Madrid*   Hosp Day # 9 PCP PHYSICIAN NONSTAFF     Date of Admission: 2024  Date of Discharge:   2024    Discharge Disposition: Home or Self Care    Discharge Diagnosis:  Cardiac chest pain  CAD status post CABG  Postop hyperglycemia  Metabolic alkalosis  Leukocytosis  Expected postop anemia  GERD  Obesity, BMI 30    History of Present Illness:   49 year old male with past medical history significant for GERD presents to the ER with c/o chest pain over the past 2-3 weeks.  Pt states the pain is located in the middle of his chest and he describes it as a pressure.  He notices it is worse with exertion.  His mother had a heart attack 3 months.  He was seen in the ER on  and was [facility]  He was told it was abnormal.  He states that he was told he need an angiogram but since then his symptoms have worsened so he came into the ER.  He denies pain at this time.     Brief Synopsis:   #Chest pain  #CAD  Patient presented with chest pain.  He was found to have an abnormal stress test.  He underwent left heart cath, aspirin desensitization.  He was found to have multivessel CAD involving /subtotal occlusion of the proximal LAD.  TTE with EF 60 to 65%.  He underwent CABG x 2 on 2024.  Postoperative course was relatively unremarkable.  His A1c was 5.2.    Lace+ Score: 34  59-90 High Risk  29-58 Medium Risk  0-28   Low Risk       TCM Follow-Up Recommendation:  LACE 29-58: Moderate Risk of readmission after discharge from the hospital.      Procedures during hospitalization:   CABGx2    Incidental or significant findings and recommendations (brief descriptions):  NA    Lab/Test results pending at Discharge:   NA    Consultants:  Cardiology, CV surgery     Discharge  Medication List:     Discharge Medications        START taking these medications        Instructions Prescription details   aspirin 81 MG Tbec      Take 1 tablet (81 mg total) by mouth daily.   Quantity: 30 tablet  Refills: 11     HYDROcodone-acetaminophen 5-325 MG Tabs  Commonly known as: Norco      Take 1 tablet by mouth every 6 (six) hours as needed.   Quantity: 15 tablet  Refills: 0     metoprolol succinate ER 50 MG Tb24  Commonly known as: Toprol XL      Take 1 tablet (50 mg total) by mouth Daily Beta Blocker.   Quantity: 30 tablet  Refills: 2            CONTINUE taking these medications        Instructions Prescription details   atorvastatin 80 MG Tabs  Commonly known as: Lipitor      Take 1 tablet (80 mg total) by mouth daily.   Refills: 0     clopidogrel 75 MG Tabs  Commonly known as: Plavix      Take 1 tablet (75 mg total) by mouth daily.   Refills: 0            STOP taking these medications      metoprolol tartrate 25 MG Tabs  Commonly known as: Lopressor                  Where to Get Your Medications        These medications were sent to [id number] - [place], [place] - [address] [facility] [address], [phone], [phone]  [facility] [address] [address]      Hours: 24-hours Phone: [phone]   aspirin 81 MG Tbec  HYDROcodone-acetaminophen 5-325 MG Tabs  metoprolol succinate ER 50 MG Tb24         [place]    Follow-up appointment:   Aristeo Stovall MD  [address] [place] [address] [id number] [phone]    Follow up on 1/6/2025  @ 11:30 w/Dr. Stovall, cardiology follow up, 4th [facility]  [address] [place] [id number]  Follow up on 1/6/2025  For a chest xray, central scheduling will call you to arrange the appointment.    Appointments for Next 30 Days 12/28/2024 - 1/27/2025        Date Arrival Time Visit Type Length Department Provider      2025 11:00 AM  POST OP [[id number]] 15 min [facility], Jess Cuello PA    Patient Instructions:                           Vital signs:  Temp:  [98.1 °F (36.7 °C)] 98.1 °F (36.7 °C)  Pulse:  [] 103  Resp:  [19-23] 20  BP: (105-116)/(72-80) 116/80  SpO2:  [94 %] 94 %    Physical Exam:    General: No acute distress   Lungs: clear to auscultation  Cardiovascular: S1, S2  Abdomen: Soft    -----------------------------------------------------------------------------------------------  PATIENT DISCHARGE INSTRUCTIONS: See electronic chart    Brinda Villegas MD    Total time spent on discharge plannin minutes     The  Cures Act makes medical notes like these available to patients in the interest of transparency. Please be advised this is a medical document. Medical documents are intended to carry relevant information, facts as evident, and the clinical opinion of the practitioner. The medical note is intended as peer to peer communication and may appear blunt or direct. It is written in medical language and may contain abbreviations or verbiage that are unfamiliar.

## 2024-12-27 NOTE — CM/SW NOTE
CM notified pt will likely DC today. HH provider list provided in CNICU prior to transfer. CM will f/u for choice. Pt will require referral for both follow up w/ Dr. Massey and for [facility] services.     [facility] called for assistance. [phone]. (Available M-F 9 am-3:30 pm). Pt's PCP Dr. Eneida Marin sent request for referrals for both outpatient Cardiology follow up and [facility] services. Office can be reached at [phone].    CM met w/ pt and partner to discuss the above. Pt/ partner selected [facility]. Agency reserved and updated re: referral/PCP information. Signed orders sent to agency via [employer].    Pt/partner with concerns re: billing. Contact information for financial counselors, along with a financial assistance application provided. Contact for [facility] also provided.        CM/SW will remain available for DC planning and/or support.     SKYLA Davis, CMSRN    [id number]

## 2024-12-27 NOTE — PLAN OF CARE
Received patient from NOC RN at 0730. Patient A&O x4 and on room air, bilat lung sounds clear. Patient voiding without complications. Patient is reporting no pain at this time and is resting in bed. Fall precautions in place, call light and belongings within reach. Plan of care evolving.     POC:   - Plan to discharge later today    Problem: Patient/Family Goals  Goal: Patient/Family Long Term Goal  Description: Patient's Long Term Goal: To stay out of the hospital    Interventions:  - Take all medications as prescribed  - Attend all follow up appointments  - See additional Care Plan goals for specific interventions  Outcome: Progressing  Goal: Patient/Family Short Term Goal  Description: Patient's Short Term Goal: To discharge home    Interventions:   - Cath 12/19  - Cardiology to follow up  - See additional Care Plan goals for specific interventions  Outcome: Progressing     Problem: CARDIOVASCULAR - ADULT  Goal: Maintains optimal cardiac output and hemodynamic stability  Description: INTERVENTIONS:  - Monitor vital signs, rhythm, and trends  - Monitor for bleeding, hypotension and signs of decreased cardiac output  - Evaluate effectiveness of vasoactive medications to optimize hemodynamic stability  - Monitor arterial and/or venous puncture sites for bleeding and/or hematoma  - Assess quality of pulses, skin color and temperature  - Assess for signs of decreased coronary artery perfusion - ex. Angina  - Evaluate fluid balance, assess for edema, trend weights  Outcome: Progressing  Goal: Absence of cardiac arrhythmias or at baseline  Description: INTERVENTIONS:  - Continuous cardiac monitoring, monitor vital signs, obtain 12 lead EKG if indicated  - Evaluate effectiveness of antiarrhythmic and heart rate control medications as ordered  - Initiate emergency measures for life threatening arrhythmias  - Monitor electrolytes and administer replacement therapy as ordered  Outcome: Progressing     Problem: PAIN -  ADULT  Goal: Verbalizes/displays adequate comfort level or patient's stated pain goal  Description: INTERVENTIONS:  - Encourage pt to monitor pain and request assistance  - Assess pain using appropriate pain scale  - Administer analgesics based on type and severity of pain and evaluate response  - Implement non-pharmacological measures as appropriate and evaluate response  - Consider cultural and social influences on pain and pain management  - Manage/alleviate anxiety  - Utilize distraction and/or relaxation techniques  - Monitor for opioid side effects  - Notify MD/LIP if interventions unsuccessful or patient reports new pain  - Anticipate increased pain with activity and pre-medicate as appropriate  Outcome: Progressing

## 2024-12-27 NOTE — PLAN OF CARE
Patient okay to discharge by primary and consults. Patient discharged home with home health. IV discontinued without complications. Discharge paperwork, follow up care, and prescriptions reviewed and given. All questions answered. Patient stable and escorted out via wheelchair.

## 2024-12-27 NOTE — PHYSICAL THERAPY NOTE
PHYSICAL THERAPY TREATMENT NOTE - INPATIENT    Room Number: 8618/8618-A     Session: 1     Number of Visits to Meet Established Goals: 5       Co-Morbidities : GERD    PHYSICAL THERAPY MEDICAL/SOCIAL HISTORY  History related to current admission: Patient is a 49 year old male admitted on 12/18/2024: Presented with chest pain dx  severe multivessel coronary artery disease s/p Coronary revascularization x2:  Left internal mammary artery graft to the left anterior descending, saphenous vein graft to the obtuse marginal. 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    Patient currently does not meet criteria for skilled inpatient physical therapy services, however patient will continue to benefit from QID ambulation with nursing staff.  Pt is discharged from IP PT services.  RN aware to re-order if there is a decline in mobility status.      Patient continues to benefit from continued skilled PT services: at discharge to promote prior level of function and safety with additional support and return home with home health PT.    PLAN DURING HOSPITALIZATION  Nursing Mobility Recommendation : 1 Assist  PT Device Recommendation: Rolling walker  PT Treatment Plan: Bed mobility;Body mechanics;Coordination;Endurance;Energy conservation;Patient education;Family education;Gait training;Neuromuscular re-educate;Range of motion;Strengthening;Stoop training;Stair training;Transfer training;Balance training  Frequency (Obs): 3-5x/week     CURRENT GOALS     Goal #1 Patient is able to demonstrate supine - sit EOB @ level: supervision      Goal #2 Patient is able to demonstrate  transfers EOB to/from Chair/Wheelchair at assistance level: supervision      Goal #3 Patient is able to ambulate 150 feet with assist device: none at assistance level: supervision      Goal #4 Patient will navigate stairs with rail and SBA   Goal #5 Patient will participate in CV binder HEP   Goal #6     Goal Comments: Goals established on 2024 all goals achieved at SBA level or above without use of device    SUBJECTIVE  \"I am doing suprisingly good\"    OBJECTIVE  Precautions: Sternal;Cardiac    WEIGHT BEARING RESTRICTION     PAIN ASSESSMENT   Ratin  Location: sternal  Management Techniques: Activity promotion;Body mechanics;Repositioning    BALANCE                                                                                                                       Static Sitting: Good  Dynamic Sitting: Good           Static Standing: Fair -  Dynamic Standing: Fair -    ACTIVITY TOLERANCE                         O2 WALK       AM-PAC '6-Clicks' INPATIENT SHORT FORM - BASIC MOBILITY  How much difficulty does the patient currently have...  Patient Difficulty: Turning over in bed (including adjusting bedclothes, sheets and blankets)?: None   Patient Difficulty: Sitting down on and standing up from a chair with arms (e.g., wheelchair, bedside commode, etc.): A Little   Patient Difficulty: Moving from lying on back to sitting on the side of the bed?: None   How much help from another person does the patient currently need...   Help from Another: Moving to and from a bed to a chair (including a wheelchair)?: A Little   Help from Another: Need to walk in hospital room?: A Little   Help from Another: Climbing 3-5 steps with a railing?: A Little     AM-PAC Score:  Raw Score: 20   Approx Degree of Impairment: 35.83%   Standardized Score (AM-PAC Scale): 47.67   CMS Modifier (G-Code): CJ    FUNCTIONAL ABILITY STATUS  Gait Assessment   Functional Mobility/Gait Assessment  Gait Assistance:  Supervision  Distance (ft): 150  Assistive Device: None  Pattern: Within Functional Limits  Stairs: Stairs  How Many Stairs: 22  Device: 1 Rail  Assist: Supervision  Pattern: Ascend and Descend  Ascend and Descend : Step to    Skilled Therapy Provided    Bed Mobility:  Rolling: NT   Supine<>Sit: Mod I   Sit<>Supine: NT     Transfer Mobility:  Sit<>Stand: Mod I   Stand<>Sit: Mod I   Gait: up SBA no device    Therapist's Comments: reviewed HEP.  Stairs completed.  HR 91bpm after activity - denied SOB      THERAPEUTIC EXERCISES  Lower Extremity Ankle pumps     Upper Extremity Elbow flex/ext     Position Sitting     Repetitions   10   Sets   1     Patient End of Session: Up in chair;Needs met;Call light within reach;RN aware of session/findings;All patient questions and concerns addressed;Hospital anti-slip socks    PT Session Time: 15 minutes  Gait Training: 15 minutes  Therapeutic Activity: 0 minutes  Therapeutic Exercise: 0 minutes   Neuromuscular Re-education: 0 minutes

## 2024-12-27 NOTE — PLAN OF CARE
Received patient at 1930.  Alert and oriented x 4. Tele Rhythm NSR, oxygen maintained on room air. Breath sounds are clear. Skin -donor site and sternal incision with steri strips-intact, clean. Last bowel movement 12/27-loose due to laxatives. Patient voiding with no issues. Patient reports some incisional pain-PRN meds. Also, pt concerned about mucus build up, encouraged to splint incision, cough, deep breathe. Utilizing incentive spirometry, ambulating often with no issues, No chest pain or shortness of breath. Bed is locked and in low position. Call light and personal items within reach. Pt up stand by. Reviewed plan of care and patient verbalizes understanding.         Problem: Patient/Family Goals  Goal: Patient/Family Long Term Goal  Description: Patient's Long Term Goal: To stay out of the hospital    Interventions:  - Take all medications as prescribed  - Attend all follow up appointments  - See additional Care Plan goals for specific interventions  Outcome: Progressing  Goal: Patient/Family Short Term Goal  Description: Patient's Short Term Goal: To discharge home    Interventions:   - Cath 12/19  - Cardiology to follow up  - See additional Care Plan goals for specific interventions  Outcome: Progressing     Problem: CARDIOVASCULAR - ADULT  Goal: Maintains optimal cardiac output and hemodynamic stability  Description: INTERVENTIONS:  - Monitor vital signs, rhythm, and trends  - Monitor for bleeding, hypotension and signs of decreased cardiac output  - Evaluate effectiveness of vasoactive medications to optimize hemodynamic stability  - Monitor arterial and/or venous puncture sites for bleeding and/or hematoma  - Assess quality of pulses, skin color and temperature  - Assess for signs of decreased coronary artery perfusion - ex. Angina  - Evaluate fluid balance, assess for edema, trend weights  Outcome: Progressing  Goal: Absence of cardiac arrhythmias or at baseline  Description: INTERVENTIONS:  -  Continuous cardiac monitoring, monitor vital signs, obtain 12 lead EKG if indicated  - Evaluate effectiveness of antiarrhythmic and heart rate control medications as ordered  - Initiate emergency measures for life threatening arrhythmias  - Monitor electrolytes and administer replacement therapy as ordered  Outcome: Progressing     Problem: PAIN - ADULT  Goal: Verbalizes/displays adequate comfort level or patient's stated pain goal  Description: INTERVENTIONS:  - Encourage pt to monitor pain and request assistance  - Assess pain using appropriate pain scale  - Administer analgesics based on type and severity of pain and evaluate response  - Implement non-pharmacological measures as appropriate and evaluate response  - Consider cultural and social influences on pain and pain management  - Manage/alleviate anxiety  - Utilize distraction and/or relaxation techniques  - Monitor for opioid side effects  - Notify MD/LIP if interventions unsuccessful or patient reports new pain  - Anticipate increased pain with activity and pre-medicate as appropriate  Outcome: Progressing

## 2024-12-27 NOTE — PROGRESS NOTES
[facility]   part of [facility]     CV Surgery Progress Note    Drew Ozuna Patient Status:  Inpatient    1975 MRN [id number]   Location [facility] 6NE-A Attending Marcelino Madrid*   Hosp Day # 9 PCP PHYSICIAN NONSTAFF     Subjective:  Patient reports feeling good, ready to go home. Pain is controlled. Denies any dyspnea.     Tele: SR    Objective:  /73 (BP Location: Left arm)   Pulse 82   Temp 98.1 °F (36.7 °C) (Oral)   Resp 22   Ht 5' 7.99\" (1.727 m)   Wt 197 lb 5 oz (89.5 kg)   SpO2 94%   BMI 30.01 kg/m²     Intake/Output:    Intake/Output Summary (Last 24 hours) at 2024 0954  Last data filed at 2024 1205  Gross per 24 hour   Intake 120 ml   Output 1350 ml   Net -1230 ml       Labs:  Lab Results   Component Value Date    WBC 8.9 2024    RBC 3.15 2024    HGB 10.0 2024    HCT 29.3 2024    MCV 93.0 2024    MCH 31.7 2024    MCHC 34.1 2024    RDW 12.0 2024    .0 2024     Lab Results   Component Value Date     2024    K 3.3 2024     2024    CO2 33.0 2024    BUN 14 2024    CREATSERUM 0.87 2024     2024    CA 8.8 2024     Lab Results   Component Value Date    INR 1.31 (H) 2024    INR 1.41 (H) 2024    INR 0.99 2024     Physical Exam:  General: VSS, A&Ox3, in NAD  Neck: No JVD  Heart: S1, S2, RRR. Sternum stable.  Extremities: no edema  Neuro: no focal deficits     Assessment/Plan:   S/P CABGx2 with LIMA-LAD, SVG-OM POD#3  -HD stable, off support   -Plavix at discharge, patient has script from external provider    -Leukocytosis, likely reactive, afebrile, resolved- monitor  -Acute post op blood loss anemia, expected, stable/asymptomatic- monitor  -Volume status improved with diuresis, weight at baseline   -Renal function intact, good UOP  -Bowel regimen   -Pain management, prn tylenol/norco   -Chest tubes and PW  removed   -GI PPX: protonix   -DVT PPX: TEDs/SCDs  -Encourage IS/ambulation   -PT/OT/Cardiac rehab   -Ok to discharge home today from surgical standpoint      -D/W Dr. Massey/Miracle Hilton PA-C   Cardiothoracic Surgery   12/27/2024  9:57 AM

## 2024-12-27 NOTE — PROGRESS NOTES
Progress Note  Drew Ozuna Patient Status:  Inpatient    1975 MRN [id number]   Location [facility] 8NE-A Attending Marcelino Madrid*   Hosp Day # 9 PCP PHYSICIAN NONSTAFF     Subjective:  Pt states pain is controlled and he is ambulating well. Hopefully to go home today. Reports several loose BM's yesterday - stool softener discontinued.     Objective:  /73 (BP Location: Left arm)   Pulse 88   Temp 98.1 °F (36.7 °C) (Oral)   Resp 21   Ht 5' 7.99\" (1.727 m)   Wt 197 lb 5 oz (89.5 kg)   SpO2 94%   BMI 30.01 kg/m²     Telemetry: NSR    Intake/Output:    Intake/Output Summary (Last 24 hours) at 2024 1012  Last data filed at 2024 1205  Gross per 24 hour   Intake 120 ml   Output 1350 ml   Net -1230 ml       Last 3 Weights   24 0356 197 lb 5 oz (89.5 kg)   24 0400 202 lb 9.6 oz (91.9 kg)   24 0500 205 lb 14.6 oz (93.4 kg)   24 0602 195 lb 1.7 oz (88.5 kg)   24 0421 195 lb 15.8 oz (88.9 kg)   24 0000 198 lb (89.8 kg)   24 0000 197 lb 5 oz (89.5 kg)   24 1213 198 lb 6.6 oz (90 kg)   24 1132 198 lb 6.6 oz (90 kg)   24 1100 198 lb 6.6 oz (90 kg)   24 0740 205 lb (93 kg)   24 1706 205 lb (93 kg)   10/14/23 1129 210 lb (95.3 kg)       Labs:  Recent Labs   Lab 24  0402 24  0448 24  0544   * 122* 121*   BUN 15 13 14   CREATSERUM 0.95 0.91 0.87   EGFRCR 98 103 106   CA 8.6* 9.1 8.8    140 139   K 4.1 4.6 3.3*    103 101   CO2 28.0 33.0* 33.0*     Recent Labs   Lab 24  1126 24  0402 24  0544   RBC 4.20* 3.15* 3.15*   HGB 13.5 10.1* 10.0*   HCT 37.3* 29.0* 29.3*   MCV 88.8 92.1 93.0   MCH 32.1 32.1 31.7   MCHC 36.2 34.8 34.1   RDW 11.9 12.0 12.0   NEPRELIM  --  10.71*  --    WBC 13.4* 12.3* 8.9   .0 168.0 145.0*         No results for input(s): \"TROP\", \"TROPHS\", \"CK\" in the last 168 hours.    Diagnostics:  No results found.   Review of Systems    Cardiovascular:  Negative for chest pain, dyspnea on exertion, leg swelling, orthopnea and paroxysmal nocturnal dyspnea.   Respiratory:  Negative for cough and shortness of breath.        Physical Exam:    Gen: alert, oriented x 3, NAD  Heent: pupils equal, reactive. Mucous membranes moist.   Neck: no jvd  Cardiac: regular rate and rhythm, normal S1,S2, no murmur, clicks, rub or gallop, sternum stable  Lungs: CTA  Abd: soft, NT/ND +bs  Ext: no edema  Skin: Warm, dry, sternal incision CDI  Neuro: No focal deficits      Medications:     potassium chloride  40 mEq Oral Q4H    furosemide  40 mg Intravenous Daily    metoprolol tartrate  25 mg Oral 2x Daily(Beta Blocker)    aspirin  81 mg Oral Daily    sennosides  8.6 mg Oral BID    docusate sodium  100 mg Oral BID    mupirocin  1 Application Nasal BID    pantoprazole  40 mg Intravenous QAM AC    Or    pantoprazole  40 mg Oral QAM AC    atorvastatin  80 mg Oral Nightly           Assessment:  Pt is a 50yo male w/PMH of GERD, hx ASA allergy, family hx CAD who presented with chest pain. [facility] and [facility] - presented to the ER with worsening symptoms.   Severe Multivessel CAD s/p CABG x2 (LIMA-LAD, SVG-OM)  Trop WNL  C w/subtotal/ proximal LAD, 60% OM1  Echo LVEF 60-65%, no RWMA  Now POD 3 s/p CABG - doing well  On asa, bb, high intensity statin   ASA Allergy/Sensitivity s/p desensitization this admit  Continue uninterrupted asa  Post op Volume Overload   Diuresing w/IV Lasix 40mg daily  Net neg 1.8L, wt downtrending  Post op Anemia - stable   Hyperlipidemia - , on high intensity statin  Hypokalemia - K 3.3    Plan:  Continue uninterrupted asa 81mg po daily  Continue lopressor 25mg po BID - will plan to transition to toprol XL prior to discharge (first dose tomorrow AM).  Continue atorvastatin 80mg po nightly   Pt appears improved from volume standpoint and wt back to baseline. Can discontinue IV Lasix.   Encourage IS,  ambulation, cardiac rehab  Discharge planning - possibly home today pending attending cardiologist review.     Plan of care discussed with patient, RN.    Noemi SpauldingLAURI elizondo  12/27/2024  10:12 AM  [phone] [facility]  [phone] [facility]      I have personally seen and examined patient.   I have independently formulated assessment and plan  I agree with the AP note    Doing well. No complaints.     PE:  Euvolemic  RRR, no murmurs  No edema    A/P:  1. CAD s/p CABG  2. ASA desensitization  3. Volume overload  4. Anemia  5. Hyperlipidemia    Doing well. No complaints.  Ambulating  Cont with ASA, statin    Questions answered.     Will follow up in office.     Thank you for the opportunity to participate in the care of your patient.     Aristeo Stovall MD  Interventional Cardiologist  [facility]

## 2024-12-30 LAB
BASE EXCESS BLDMV CALC-SCNC: -3 MMOL/L (ref ?–30)
BASE EXCESS BLDMV CALC-SCNC: 1 MMOL/L (ref ?–30)
BASE EXCESS BLDMV CALC-SCNC: 1 MMOL/L (ref ?–30)
CA-I BLDMV-SCNC: 1.1 MMOL/L (ref 1.12–1.32)
CA-I BLDMV-SCNC: 1.11 MMOL/L (ref 1.12–1.32)
CA-I BLDMV-SCNC: 1.11 MMOL/L (ref 1.12–1.32)
CO2 BLDMV-SCNC: 24 MMOL/L (ref 22–32)
CO2 BLDMV-SCNC: 27 MMOL/L (ref 22–32)
CO2 BLDMV-SCNC: 27 MMOL/L (ref 22–32)
GLUCOSE BLD-MCNC: 108 MG/DL (ref 70–99)
GLUCOSE BLD-MCNC: 131 MG/DL (ref 70–99)
GLUCOSE BLD-MCNC: 152 MG/DL (ref 70–99)
HCO3 BLDMV-SCNC: 23 MEQ/L (ref 22–26)
HCO3 BLDMV-SCNC: 25.5 MEQ/L (ref 22–26)
HCO3 BLDMV-SCNC: 26.1 MEQ/L (ref 22–26)
HCT VFR BLDMV CALC: 28 %
HCT VFR BLDMV CALC: 29 %
HCT VFR BLDMV CALC: 30 %
ISTAT ACTIVATED CLOTTING TIME: 112 SECONDS (ref 74–137)
ISTAT ACTIVATED CLOTTING TIME: 383 SECONDS (ref 74–137)
ISTAT ACTIVATED CLOTTING TIME: 441 SECONDS (ref 74–137)
ISTAT ACTIVATED CLOTTING TIME: 567 SECONDS (ref 74–137)
ISTAT PATIENT TEMPERATURE: 32 DEGREE
ISTAT PATIENT TEMPERATURE: 32 DEGREE
PCO2 BLDMV: 34.4 MMHG (ref 38–50)
PCO2 BLDMV: 35.5 MMHG (ref 38–50)
PCO2 BLDMV: 39.2 MMHG (ref 38–50)
PH BLDMV: 7.41 [PH] (ref 7.32–7.43)
PH BLDMV: 7.42 [PH] (ref 7.32–7.43)
PH BLDMV: 7.45 [PH] (ref 7.32–7.43)
PO2 BLDMV: <70 MMHG (ref 35–40)
SAO2 % BLDMV: 82 % (ref 60–85)
SAO2 % BLDMV: 86 % (ref 60–85)
SAO2 % BLDMV: 87 % (ref 60–85)
SODIUM BLDMV-SCNC: 132 MMOL/L (ref 136–145)
SODIUM BLDMV-SCNC: 133 MMOL/L (ref 136–145)
SODIUM BLDMV-SCNC: 133 MMOL/L (ref 136–145)
SODIUM BLDMV-SCNC: 5.1 MMOL/L (ref 3.6–5.1)
SODIUM BLDMV-SCNC: 6.2 MMOL/L (ref 3.6–5.1)
SODIUM BLDMV-SCNC: 6.9 MMOL/L (ref 3.6–5.1)

## 2025-01-02 ENCOUNTER — TELEPHONE (OUTPATIENT)
Dept: CARDIOLOGY UNIT | Facility: HOSPITAL | Age: 50
End: 2025-01-02

## 2025-01-02 NOTE — PROGRESS NOTES
Follow Up Phone Call    1. How are you doing now that you are home? No Complaints    2. Have there been any changes in your wound/incision since going home? No    3. Is your pain manageable at home? Yes, No pain since discharge    4. Are you following the walking routine given to you in the hospital? Yes,   \"Walking a mile day\"    5. Are you continuing to use your incentive spirometer? Yes, up to 2050    6. Do you have your appointments for Chest Xray?  Yes, on !/6/2025 at 10:30                                                              Primary MD?  No                                                              Cardiologist?  Yes, 1/6/2025 at 12:40                                                              Dr. Massey? Yes, 1/21/2025                  Cardiac Rehab?  No, he is trying to see whether he can set up through the [facility]    7. Do you have any other questions or concerns today? Yes, He asked about the nutrition class and will go to the free class offered here.         Estefania PALMA RN  1/2/2025  2:16 PM

## 2025-01-02 NOTE — PROGRESS NOTES
Pts partner called to report pts right eye is itchy at times at night, d/w patient, he states he feels fine, had some right eye irritation last night, is normal today, per patient he had a bit a incisional pain today relived with tylenol ES, reassured this is all normal and par for the course, he states he is progressing nicely walking a mile a day, advised to call with any further questions or concerns, they understand and agree.  Margie Paulino RN  Clinical Coordinator  [facility]

## 2025-01-06 ENCOUNTER — HOSPITAL ENCOUNTER (OUTPATIENT)
Dept: GENERAL RADIOLOGY | Facility: HOSPITAL | Age: 50
Discharge: HOME | End: 2025-01-06
Attending: THORACIC SURGERY (CARDIOTHORACIC VASCULAR SURGERY)
Payer: OTHER GOVERNMENT

## 2025-01-06 DIAGNOSIS — J90 PLEURAL EFFUSION: ICD-10-CM

## 2025-01-06 PROCEDURE — 71048 X-RAY EXAM CHEST 4+ VIEWS: CPT | Performed by: THORACIC SURGERY (CARDIOTHORACIC VASCULAR SURGERY)

## 2025-01-20 ENCOUNTER — ORDER TRANSCRIPTION (OUTPATIENT)
Dept: CARDIAC REHAB | Facility: HOSPITAL | Age: 50
End: 2025-01-20

## 2025-01-20 DIAGNOSIS — Z95.1 S/P CABG (CORONARY ARTERY BYPASS GRAFT): Primary | ICD-10-CM

## 2025-02-13 ENCOUNTER — CARDPULM VISIT (OUTPATIENT)
Age: 50
End: 2025-02-13
Attending: INTERNAL MEDICINE
Payer: OTHER GOVERNMENT

## 2025-02-14 ENCOUNTER — ORDER TRANSCRIPTION (OUTPATIENT)
Dept: CARDIAC REHAB | Facility: HOSPITAL | Age: 50
End: 2025-02-14

## 2025-02-14 DIAGNOSIS — Z95.1 S/P CABG (CORONARY ARTERY BYPASS GRAFT): Primary | ICD-10-CM

## 2025-02-17 ENCOUNTER — CARDPULM VISIT (OUTPATIENT)
Age: 50
End: 2025-02-17
Attending: INTERNAL MEDICINE
Payer: OTHER GOVERNMENT

## 2025-02-17 ENCOUNTER — APPOINTMENT (OUTPATIENT)
Age: 50
End: 2025-02-17
Attending: INTERNAL MEDICINE
Payer: OTHER GOVERNMENT

## 2025-02-17 PROCEDURE — 93798 PHYS/QHP OP CAR RHAB W/ECG: CPT

## 2025-02-19 ENCOUNTER — APPOINTMENT (OUTPATIENT)
Age: 50
End: 2025-02-19
Attending: INTERNAL MEDICINE
Payer: OTHER GOVERNMENT

## 2025-02-19 ENCOUNTER — CARDPULM VISIT (OUTPATIENT)
Age: 50
End: 2025-02-19
Attending: INTERNAL MEDICINE
Payer: OTHER GOVERNMENT

## 2025-02-19 PROCEDURE — 93798 PHYS/QHP OP CAR RHAB W/ECG: CPT

## 2025-02-20 ENCOUNTER — CARDPULM VISIT (OUTPATIENT)
Age: 50
End: 2025-02-20
Attending: INTERNAL MEDICINE
Payer: OTHER GOVERNMENT

## 2025-02-20 ENCOUNTER — APPOINTMENT (OUTPATIENT)
Age: 50
End: 2025-02-20
Attending: INTERNAL MEDICINE
Payer: OTHER GOVERNMENT

## 2025-02-20 PROCEDURE — 93798 PHYS/QHP OP CAR RHAB W/ECG: CPT

## 2025-02-24 ENCOUNTER — CARDPULM VISIT (OUTPATIENT)
Age: 50
End: 2025-02-24
Attending: INTERNAL MEDICINE
Payer: OTHER GOVERNMENT

## 2025-02-24 ENCOUNTER — APPOINTMENT (OUTPATIENT)
Age: 50
End: 2025-02-24
Attending: INTERNAL MEDICINE
Payer: OTHER GOVERNMENT

## 2025-02-24 PROCEDURE — 93798 PHYS/QHP OP CAR RHAB W/ECG: CPT

## 2025-02-26 ENCOUNTER — APPOINTMENT (OUTPATIENT)
Age: 50
End: 2025-02-26
Attending: INTERNAL MEDICINE
Payer: OTHER GOVERNMENT

## 2025-02-26 ENCOUNTER — CARDPULM VISIT (OUTPATIENT)
Age: 50
End: 2025-02-26
Attending: INTERNAL MEDICINE
Payer: OTHER GOVERNMENT

## 2025-02-26 PROCEDURE — 93798 PHYS/QHP OP CAR RHAB W/ECG: CPT

## 2025-02-27 ENCOUNTER — APPOINTMENT (OUTPATIENT)
Age: 50
End: 2025-02-27
Attending: INTERNAL MEDICINE
Payer: OTHER GOVERNMENT

## 2025-02-27 ENCOUNTER — CARDPULM VISIT (OUTPATIENT)
Age: 50
End: 2025-02-27
Attending: INTERNAL MEDICINE
Payer: OTHER GOVERNMENT

## 2025-02-27 PROCEDURE — 93798 PHYS/QHP OP CAR RHAB W/ECG: CPT

## 2025-03-03 ENCOUNTER — APPOINTMENT (OUTPATIENT)
Age: 50
End: 2025-03-03
Attending: INTERNAL MEDICINE
Payer: OTHER GOVERNMENT

## 2025-03-03 ENCOUNTER — CARDPULM VISIT (OUTPATIENT)
Age: 50
End: 2025-03-03
Attending: INTERNAL MEDICINE
Payer: OTHER GOVERNMENT

## 2025-03-03 PROCEDURE — 93798 PHYS/QHP OP CAR RHAB W/ECG: CPT

## 2025-03-05 ENCOUNTER — APPOINTMENT (OUTPATIENT)
Age: 50
End: 2025-03-05
Attending: INTERNAL MEDICINE
Payer: OTHER GOVERNMENT

## 2025-03-05 ENCOUNTER — CARDPULM VISIT (OUTPATIENT)
Age: 50
End: 2025-03-05
Attending: INTERNAL MEDICINE
Payer: OTHER GOVERNMENT

## 2025-03-05 PROCEDURE — 93798 PHYS/QHP OP CAR RHAB W/ECG: CPT

## 2025-03-06 ENCOUNTER — CARDPULM VISIT (OUTPATIENT)
Age: 50
End: 2025-03-06
Attending: INTERNAL MEDICINE
Payer: OTHER GOVERNMENT

## 2025-03-06 PROCEDURE — 93798 PHYS/QHP OP CAR RHAB W/ECG: CPT

## 2025-03-10 ENCOUNTER — CARDPULM VISIT (OUTPATIENT)
Age: 50
End: 2025-03-10
Attending: INTERNAL MEDICINE
Payer: OTHER GOVERNMENT

## 2025-03-10 PROCEDURE — 93798 PHYS/QHP OP CAR RHAB W/ECG: CPT

## 2025-03-12 ENCOUNTER — CARDPULM VISIT (OUTPATIENT)
Age: 50
End: 2025-03-12
Attending: INTERNAL MEDICINE
Payer: OTHER GOVERNMENT

## 2025-03-12 PROCEDURE — 93798 PHYS/QHP OP CAR RHAB W/ECG: CPT

## 2025-03-13 ENCOUNTER — CARDPULM VISIT (OUTPATIENT)
Age: 50
End: 2025-03-13
Attending: INTERNAL MEDICINE
Payer: OTHER GOVERNMENT

## 2025-03-13 PROCEDURE — 93798 PHYS/QHP OP CAR RHAB W/ECG: CPT

## 2025-03-17 ENCOUNTER — APPOINTMENT (OUTPATIENT)
Age: 50
End: 2025-03-17
Attending: INTERNAL MEDICINE
Payer: OTHER GOVERNMENT

## 2025-03-19 ENCOUNTER — APPOINTMENT (OUTPATIENT)
Age: 50
End: 2025-03-19
Attending: INTERNAL MEDICINE
Payer: OTHER GOVERNMENT

## 2025-03-20 ENCOUNTER — CARDPULM VISIT (OUTPATIENT)
Age: 50
End: 2025-03-20
Attending: INTERNAL MEDICINE
Payer: OTHER GOVERNMENT

## 2025-03-20 PROCEDURE — 93798 PHYS/QHP OP CAR RHAB W/ECG: CPT

## 2025-03-24 ENCOUNTER — CARDPULM VISIT (OUTPATIENT)
Age: 50
End: 2025-03-24
Attending: INTERNAL MEDICINE
Payer: OTHER GOVERNMENT

## 2025-03-24 PROCEDURE — 93798 PHYS/QHP OP CAR RHAB W/ECG: CPT

## 2025-03-26 ENCOUNTER — CARDPULM VISIT (OUTPATIENT)
Age: 50
End: 2025-03-26
Attending: INTERNAL MEDICINE
Payer: OTHER GOVERNMENT

## 2025-03-26 PROCEDURE — 93798 PHYS/QHP OP CAR RHAB W/ECG: CPT

## 2025-03-27 ENCOUNTER — CARDPULM VISIT (OUTPATIENT)
Age: 50
End: 2025-03-27
Attending: INTERNAL MEDICINE
Payer: OTHER GOVERNMENT

## 2025-03-27 PROCEDURE — 93798 PHYS/QHP OP CAR RHAB W/ECG: CPT

## 2025-03-31 ENCOUNTER — CARDPULM VISIT (OUTPATIENT)
Age: 50
End: 2025-03-31
Attending: INTERNAL MEDICINE
Payer: OTHER GOVERNMENT

## 2025-03-31 PROCEDURE — 93798 PHYS/QHP OP CAR RHAB W/ECG: CPT

## 2025-04-02 ENCOUNTER — APPOINTMENT (OUTPATIENT)
Age: 50
End: 2025-04-02
Attending: INTERNAL MEDICINE
Payer: OTHER GOVERNMENT

## 2025-04-03 ENCOUNTER — CARDPULM VISIT (OUTPATIENT)
Age: 50
End: 2025-04-03
Attending: INTERNAL MEDICINE
Payer: OTHER GOVERNMENT

## 2025-04-03 PROCEDURE — 93798 PHYS/QHP OP CAR RHAB W/ECG: CPT

## 2025-04-07 ENCOUNTER — CARDPULM VISIT (OUTPATIENT)
Age: 50
End: 2025-04-07
Attending: INTERNAL MEDICINE
Payer: OTHER GOVERNMENT

## 2025-04-07 PROCEDURE — 93798 PHYS/QHP OP CAR RHAB W/ECG: CPT

## 2025-04-09 ENCOUNTER — CARDPULM VISIT (OUTPATIENT)
Age: 50
End: 2025-04-09
Attending: INTERNAL MEDICINE
Payer: OTHER GOVERNMENT

## 2025-04-09 PROCEDURE — 93798 PHYS/QHP OP CAR RHAB W/ECG: CPT

## 2025-04-10 ENCOUNTER — CARDPULM VISIT (OUTPATIENT)
Age: 50
End: 2025-04-10
Attending: INTERNAL MEDICINE
Payer: OTHER GOVERNMENT

## 2025-04-10 PROCEDURE — 93798 PHYS/QHP OP CAR RHAB W/ECG: CPT

## 2025-04-14 ENCOUNTER — CARDPULM VISIT (OUTPATIENT)
Age: 50
End: 2025-04-14
Attending: INTERNAL MEDICINE
Payer: OTHER GOVERNMENT

## 2025-04-14 PROCEDURE — 93798 PHYS/QHP OP CAR RHAB W/ECG: CPT

## 2025-04-16 ENCOUNTER — CARDPULM VISIT (OUTPATIENT)
Age: 50
End: 2025-04-16
Attending: INTERNAL MEDICINE
Payer: OTHER GOVERNMENT

## 2025-04-16 PROCEDURE — 93798 PHYS/QHP OP CAR RHAB W/ECG: CPT

## 2025-04-17 ENCOUNTER — CARDPULM VISIT (OUTPATIENT)
Age: 50
End: 2025-04-17
Attending: INTERNAL MEDICINE
Payer: OTHER GOVERNMENT

## 2025-04-17 PROCEDURE — 93798 PHYS/QHP OP CAR RHAB W/ECG: CPT

## 2025-04-21 ENCOUNTER — CARDPULM VISIT (OUTPATIENT)
Age: 50
End: 2025-04-21
Attending: INTERNAL MEDICINE
Payer: OTHER GOVERNMENT

## 2025-04-21 PROCEDURE — 93798 PHYS/QHP OP CAR RHAB W/ECG: CPT

## 2025-04-23 ENCOUNTER — CARDPULM VISIT (OUTPATIENT)
Age: 50
End: 2025-04-23
Attending: INTERNAL MEDICINE
Payer: OTHER GOVERNMENT

## 2025-04-23 PROCEDURE — 93798 PHYS/QHP OP CAR RHAB W/ECG: CPT

## 2025-04-24 ENCOUNTER — CARDPULM VISIT (OUTPATIENT)
Age: 50
End: 2025-04-24
Attending: INTERNAL MEDICINE
Payer: OTHER GOVERNMENT

## 2025-04-24 PROCEDURE — 93798 PHYS/QHP OP CAR RHAB W/ECG: CPT

## 2025-04-28 ENCOUNTER — CARDPULM VISIT (OUTPATIENT)
Age: 50
End: 2025-04-28
Attending: INTERNAL MEDICINE
Payer: OTHER GOVERNMENT

## 2025-04-28 PROCEDURE — 93798 PHYS/QHP OP CAR RHAB W/ECG: CPT

## 2025-04-30 ENCOUNTER — CARDPULM VISIT (OUTPATIENT)
Age: 50
End: 2025-04-30
Attending: INTERNAL MEDICINE
Payer: OTHER GOVERNMENT

## 2025-04-30 PROCEDURE — 93798 PHYS/QHP OP CAR RHAB W/ECG: CPT

## 2025-05-01 ENCOUNTER — CARDPULM VISIT (OUTPATIENT)
Age: 50
End: 2025-05-01
Attending: INTERNAL MEDICINE
Payer: OTHER GOVERNMENT

## 2025-05-01 PROCEDURE — 93798 PHYS/QHP OP CAR RHAB W/ECG: CPT

## 2025-05-05 ENCOUNTER — CARDPULM VISIT (OUTPATIENT)
Age: 50
End: 2025-05-05
Attending: INTERNAL MEDICINE
Payer: OTHER GOVERNMENT

## 2025-05-05 PROCEDURE — 93798 PHYS/QHP OP CAR RHAB W/ECG: CPT

## 2025-05-07 ENCOUNTER — CARDPULM VISIT (OUTPATIENT)
Age: 50
End: 2025-05-07
Attending: INTERNAL MEDICINE
Payer: OTHER GOVERNMENT

## 2025-05-07 PROCEDURE — 93798 PHYS/QHP OP CAR RHAB W/ECG: CPT

## 2025-05-08 ENCOUNTER — CARDPULM VISIT (OUTPATIENT)
Age: 50
End: 2025-05-08
Attending: INTERNAL MEDICINE
Payer: OTHER GOVERNMENT

## 2025-05-08 PROCEDURE — 93798 PHYS/QHP OP CAR RHAB W/ECG: CPT

## 2025-05-12 ENCOUNTER — APPOINTMENT (OUTPATIENT)
Age: 50
End: 2025-05-12
Attending: INTERNAL MEDICINE
Payer: OTHER GOVERNMENT

## 2025-05-14 ENCOUNTER — CARDPULM VISIT (OUTPATIENT)
Age: 50
End: 2025-05-14
Attending: INTERNAL MEDICINE
Payer: OTHER GOVERNMENT

## 2025-05-14 PROCEDURE — 93798 PHYS/QHP OP CAR RHAB W/ECG: CPT

## 2025-05-15 ENCOUNTER — CARDPULM VISIT (OUTPATIENT)
Age: 50
End: 2025-05-15
Attending: INTERNAL MEDICINE
Payer: OTHER GOVERNMENT

## 2025-05-15 PROCEDURE — 93798 PHYS/QHP OP CAR RHAB W/ECG: CPT

## 2025-05-19 ENCOUNTER — CARDPULM VISIT (OUTPATIENT)
Age: 50
End: 2025-05-19
Attending: INTERNAL MEDICINE
Payer: OTHER GOVERNMENT

## 2025-05-19 PROCEDURE — 93798 PHYS/QHP OP CAR RHAB W/ECG: CPT

## 2025-05-21 ENCOUNTER — APPOINTMENT (OUTPATIENT)
Age: 50
End: 2025-05-21
Attending: INTERNAL MEDICINE
Payer: OTHER GOVERNMENT

## 2025-05-22 ENCOUNTER — APPOINTMENT (OUTPATIENT)
Age: 50
End: 2025-05-22
Attending: INTERNAL MEDICINE
Payer: OTHER GOVERNMENT

## 2025-05-26 ENCOUNTER — APPOINTMENT (OUTPATIENT)
Age: 50
End: 2025-05-26
Attending: INTERNAL MEDICINE
Payer: OTHER GOVERNMENT

## 2025-05-28 ENCOUNTER — APPOINTMENT (OUTPATIENT)
Age: 50
End: 2025-05-28
Attending: INTERNAL MEDICINE
Payer: OTHER GOVERNMENT

## (undated) DEVICE — SUT PROL 8-0 18IN BV130-5 NABSRB BLU 6.5MM 3/

## (undated) DEVICE — CELL SAVER RESERVOIR

## (undated) DEVICE — Device: Brand: VIRTUOSAPH PLUS WITH RADIAL INDICATION

## (undated) DEVICE — CABLE SUR L12IN SM CLP LNG DISP

## (undated) DEVICE — CLIP INT L ORNG TI TRNSVRS GRV CHEVRON SHP

## (undated) DEVICE — GOWN,SIRUS,FABRIC-REINFORCED,X-LARGE: Brand: MEDLINE

## (undated) DEVICE — GLOVE SUR 7 BIOGEL PI ORTHOPRO PIP BRN PWD F

## (undated) DEVICE — STERILE POLYISOPRENE POWDER-FREE SURGICAL GLOVES: Brand: PROTEXIS

## (undated) DEVICE — [HIGH FLOW INSUFFLATOR,  DO NOT USE IF PACKAGE IS DAMAGED,  KEEP DRY,  KEEP AWAY FROM SUNLIGHT,  PROTECT FROM HEAT AND RADIOACTIVE SOURCES.]: Brand: PNEUMOSURE

## (undated) DEVICE — OPEN HEART: Brand: MEDLINE INDUSTRIES, INC.

## (undated) DEVICE — Device: Brand: INTELLICART™

## (undated) DEVICE — LACTATED R 1000ML INJ

## (undated) DEVICE — TRANSFER PACK CONTAINER 600 ML WITH COUPLER. STERILE FLUID PATH: Brand: FENWAL TRANSFER PACK CONTAINER 600 ML WITH COUPLER

## (undated) DEVICE — SUT 6 DBL WIRE 14IN CCS-1 NABSRB L49MM 1/2 CI

## (undated) DEVICE — CABLE PT L10FT ELECTRD PIN DIA1-2MM VENTRICLE

## (undated) DEVICE — SUT POLYDEK 2-0 75CM NABSRB GRN

## (undated) DEVICE — SOLUTION IRRIG 1000ML 0.9% NACL USP BTL

## (undated) DEVICE — SYRINGE MED 30ML STD CLR PLAS LL TIP N CTRL

## (undated) NOTE — LETTER
[facility] 8NE-A  [address] [address]  [phone]    Blood Transfusion Consent    In the course of your treatment, it may become necessary to administer a transfusion of blood or blood components. This form provides basic information concerning this procedure and, if signed by you, authorizes its administration. By signing this form, you agree that all of your questions about the administration of blood or blood products have been answered by the ordering medical professional or designee.    Description of Procedure  Blood is introduced into one of your veins, commonly in the arm, using a sterilized disposable needle. The amount of blood transfused, and whether the transfusion will be of blood or blood components is a judgement the physician will make based on your particular needs.    Risks  The transfusion is a common procedure of low risk.  MINOR AND TEMPORARY REACTIONS ARE NOT UNCOMMON, including a slight bruise, swelling or local reaction in the area where the needle pierces your skin, or a nonserious reaction to the transfused material itself, including headache, fever or mild skin reaction, such as rash.  Serious reactions are possible, though very unlikely, and include severe allergic reaction (shock) and destruction (hemolysis) of transfused blood cells.  Infectious diseases which are known to be transmitted by blood transfusion include certain types of viral Hepatitis(liver infection from a virus), Human Immunodeficiency Virus (HIV-1,2) infection, a viral infection known to cause Acquired Immunodeficiency Syndrome (AIDS), as well as certain other bacterial, viral, and parasitic diseases. While a minimal risk of acquiring an infectious disease from transfused blood exists, in accordance with the Federal and State law, all due care has been taken in donor selection and testing to avoid transmission of disease.    Alternatives  If loss of blood poses serious threats during your  treatment, THERE IS NO EFFECTIVE ALTERNATIVE TO BLOOD TRANSFUSION. However, if you have any further questions on this matter, your provider will fully explain the alternatives to you if it has not already been done.    I, ______________________________, have read/had read to me the above. I understand the matters bearing on the decision whether or not to authorize a transfusion of blood or blood components. I have no questions which have not been answered to my full satisfaction. I hereby consent to such transfusion as my physician may deem necessary or advisable in the course of my treatment.    ______________________________________________                    ___________________________  (Signature of Patient or Responsible party in case of minor,                 (Printed Name of Patient or incompetent, or unconscious patient)              Responsible Party)    ___________________________               _____________________  (Relationship to Patient if not self)                                    (Date and Time)    __________________________                                                           ______________________              (Signature of Witness)               (Printed Name of Witness)     Language line ()    Telephone/Verbal/Video Consent    __________________________                     ____________________  (Signature of 2nd Witness           (Printed Name of 2nd  Telephone/Verbal/Video Consent)           Witness)    Patient Name: Drew Ozuna     : 1975                 Printed: 2024     Medical Record #: [id number]      Rev: 2023

## (undated) NOTE — LETTER
[address]  [address]  Consent for Procedure/Sedation  Date: ***         Time: ***    {[facility] ivs consent:72}

## (undated) NOTE — LETTER
[address]  [address]  Consent for Procedure/Sedation  Date: 12/18/2024         Time: 1630    I hereby authorize Dr. Stovall, my physician and his/her assistants (if applicable), which may include medical students, residents, and/or fellows, to perform the following surgical operation/ procedure and administer such anesthesia as may be determined necessary by my physician:  Operation/Procedure name (s)  Cardiac Catheterization, Left Ventricular Cineangiography, Bilateral Selective Coronary Angiography and/or Right Heart Catheterization; possible Percutaneous Transluminal Coronary Angioplasty, Coronary Atherectomy, Coronary Stent, Intracoronary Thrombolytic therapy, Antiplatelet therapy and/or Intravascular Ultrasound on Drew Ozuna   2.   I recognize that during the surgical operation/procedure, unforeseen conditions may necessitate additional or different procedures than those listed above.  I, therefore, further authorize and request that the above-named surgeon, assistants, or designees perform such procedures as are, in their judgment, necessary and desirable.    3.   My surgeon/physician has discussed prior to my surgery the potential benefits, risks and side effects of this procedure; the likelihood of achieving goals; and potential problems that might occur during recuperation.  They also discussed reasonable alternatives to the procedure, including risks, benefits, and side effects related to the alternatives and risks related to not receiving this procedure.  I have had all my questions answered and I acknowledge that no guarantee has been made as to the result that may be obtained.    4.   Should the need arise during my operation/procedure, which includes change of level of care prior to discharge, I also consent to the administration of blood and/or blood products.  Further, I understand that despite careful testing and screening of blood or blood products by  collecting agencies, I may still be subject to ill effects as a result of receiving a blood transfusion and/or blood products.  The following are some, but not all, of the potential risks that can occur: fever and allergic reactions, hemolytic reactions, transmission of diseases such as Hepatitis, AIDS and Cytomegalovirus (CMV) and fluid overload.  In the event that I wish to have an autologous transfusion of my own blood, or a directed donor transfusion, I will discuss this with my physician.  Check only if Refusing Blood or Blood Products  I understand refusal of blood or blood products as deemed necessary by my physician may have serious consequences to my condition to include possible death. I hereby assume responsibility for my refusal and release the hospital, its personnel, and my physicians from any responsibility for the consequences of my refusal.          o  Refuse      5.   I authorize the use of any specimen, organs, tissues, body parts or foreign objects that may be removed from my body during the operation/procedure for diagnosis, research or teaching purposes and their subsequent disposal by hospital authorities.  I also authorize the release of specimen test results and/or written reports to my treating physician on the hospital medical staff or other referring or consulting physicians involved in my care, at the discretion of the Pathologist or my treating physician.    6.   I consent to the photographing or videotaping of the operations or procedures to be performed, including appropriate portions of my body for medical, scientific, or educational purposes, provided my identity is not revealed by the pictures or by descriptive texts accompanying them.  If the procedure has been photographed/videotaped, the surgeon will obtain the original picture, image, videotape or CD.  The hospital will not be responsible for storage, release or maintenance of the picture, image, tape or CD.    7.   I consent  to the presence of a  or observers in the operating room as deemed necessary by my physician or their designees.    8.   I recognize that in the event my procedure results in extended X-Ray/fluoroscopy time, I may develop a skin reaction.    9. If I have a Do Not Attempt Resuscitation (DNAR) order in place, that status will be suspended while in the operating room, procedural suite, and during the recovery period unless otherwise explicitly stated by me (or a person authorized to consent on my behalf). The surgeon or my attending physician will determine when the applicable recovery period ends for purposes of reinstating the DNAR order.  10. Patients having a sterilization procedure: I understand that if the procedure is successful the results will be permanent and it will therefore be impossible for me to inseminate, conceive, or bear children.  I also understand that the procedure is intended to result in sterility, although the result has not been guaranteed.   11. I acknowledge that my physician has explained sedation/analgesia administration to me including the risk and benefits I consent to the administration of sedation/analgesia as may be necessary or desirable in the judgment of my physician.    I CERTIFY THAT I HAVE READ AND FULLY UNDERSTAND THE ABOVE CONSENT TO OPERATION and/or OTHER PROCEDURE.      ____________________________________       _________________________________      ______________________________  Signature of Patient         Signature of Responsible Person        Printed Name of Responsible Person   ____________________________________      _________________________________      ______________________________       Signature of Witness          Relationship to Patient                       Date                                       Time  Patient Name: Drew LOUIE Ozuna  : 1975    Reviewed: 2024   Printed: 2024  Medical Record #: [id number]  Page 1 of 1

## (undated) NOTE — LETTER
Aldo Hudson M.D., F.A.C.S.  Cornelio Glasgow M.D., F.A.C.S.  Manjeet France M.D.   Calvin Lopez M.D., F.A.C.S.  MIGUELANGEL Mancuso M.D., F.A.C.S.   Grant Mckeon M.D., F.A.C.S.  Kwabena Patterson M.D.    MIGUELANGEL Russo M.D.   MIGUELANGEL Cruz M.D., M.B.A.  Nathan Thomson M.D.  MIGUELANGEL Gonzalez M.D., F.A.C.S.  Randell Massey M.D., F.A.C.S.   MIGUELANGEL Yoon M.D., F.A.C.S., F.A.C.C. Logan Alcantara M.D.  Juan Carlos Rodriguez M.D.    DONYA Person D.O., F.A.C.S.  Deep Rausch M.D.   Jon Banerjee M.D.  Alexis Obrien M.D.    Giovanni Stauffer M.D., F.A.C.S.        Welcome to [facility]    As you contemplate possible surgical treatment, it is very important to us that you understand fully what is being discussed, that all of your questions have been answered, and that your options for treatment have been fully explained.    To that end, on the following page we will ask you some questions to make certain that you understand everything which has been explained to you. Included in this understanding is that there are both surgical and nonsurgical treatments available for you, that you have options regarding where your care is given, and what doctors are involved in your care. Included in these options would, of course, be the option to elect for no treatment whatsoever. We especially want to be sure that you have had a chance to have all of your questions and concerns answered. If there are any issues which have not been adequately addressed, we ask you to bring them forward so that we can thoroughly address them.    A patient who is fully informed and understands their condition and options for treatment, as well as potential adverse effects of treatment, is going to be a patient who receives the most benefit out of care rendered.  Our goal in addition to providing excellent surgical care is to provide the necessary information to you and your family in order to make decisions which are appropriate relative to your own care.    Please take the time necessary to read and answer the questions on the next page. Again, if you have any questions, bring them forward and we will certainly address them.    Sincerely,    [facility]    Date:___________________ _______________________ _______________________       Printed Patient Name  Signature of Patient    Date:___________________ _______________________ _______________________       Printed Witness Name  Signature of Witness    _______________________       Relationship of Witness to Patient        Consent Form Page: 1 of 2  [address] [facility] [address] [facility] * [facility] [address] * [place] [address] * [phone] / [phone] *  Fax [phone] * Billing Fax [phone] Version: 9/9/2024    [facility]  Supplemental Consent Form    A [facility] surgeon has met with me and explained the matter of my illness, and what treatments might be available to improve my condition. As a result of that conversation, I understand the following:    A [facility] surgeon met with me and explained, in detail, the nature of my condition for which surgery is being contemplated. The procedure being performed is:  CORONARY ARTERY BYPASS GRAFT     Yes _____ No _____    A [facility] surgeon met with me and explained to me that there are alternatives to surgery which may include no surgery, medical therapy, or interventional treatment, among other options and the risks and benefits of the different treatment options:Yes _____ No _____    A [facility] surgeon has explained to me that if I should desire, he/she is willing to explain my case and the surgical and non-surgical options to family members:            Yes _____ No _____    A [facility] surgeon has answered all of my questions  regarding the topics we have discussed. I have been invited to ask more questions:  Yes _____ No _____    A [facility] surgeon has explained to me that if I seek other options or wish treatment at elsewhere, that his/her office will assist me in making such recommendations:  Yes _____ No _____    A [facility] surgeon has explained to me that death, risk of bleeding, stroke, multi-organ failure, heart attack and/or other complications are risks for the proposed surgical procedure:        Yes _____ No _____    A [facility] surgeon has explained to me that I have the right to cancel or postpone the surgery at any time prior to the start of surgery:    Yes _____ No _____    The nature and options for treatment for my condition has been explained to me, in detail, by a [facility] surgeon and all questions have been answered to my satisfaction. I understand that I am not required to undergo surgery, and further, that if I so desire, I could have surgery accomplished by another surgeon or at another institution. I understand and accept that which has been explained to me. I am able to make my decisions knowingly and willingly based on the data.    Date:___________________ _______________________ _______________________       Printed Patient Name  Signature of Patient    Date:___________________ _______________________ _______________________       Printed Witness Name  Signature of Witness    _______________________   Relationship of Witness to Patient          Consent Form Page: 3 of  2  [address] [facility] * [address] * [place] [address] * [phone] / [phone] *  Fax [phone] * Billing Fax [phone] Version: 9/9/2024

## (undated) NOTE — LETTER
[address]  [address]  Consent for Procedure/Sedation  Date: t         Time: n    I hereby authorize Dr. Polanco, my physician and his/her assistants (if applicable), which may include medical students, residents, and/or fellows, to perform the following surgical operation/ procedure and administer such anesthesia as may be determined necessary by my physician: Aspirin desensitization on Drew Ozuna  2.   I recognize that during the surgical operation/procedure, unforeseen conditions may necessitate additional or different procedures than those listed above.  I, therefore, further authorize and request that the above-named surgeon, assistants, or designees perform such procedures as are, in their judgment, necessary and desirable.    3.   My surgeon/physician has discussed prior to my surgery the potential benefits, risks and side effects of this procedure; the likelihood of achieving goals; and potential problems that might occur during recuperation.  They also discussed reasonable alternatives to the procedure, including risks, benefits, and side effects related to the alternatives and risks related to not receiving this procedure.  I have had all my questions answered and I acknowledge that no guarantee has been made as to the result that may be obtained.    4.   Should the need arise during my operation/procedure, which includes change of level of care prior to discharge, I also consent to the administration of blood and/or blood products.  Further, I understand that despite careful testing and screening of blood or blood products by collecting agencies, I may still be subject to ill effects as a result of receiving a blood transfusion and/or blood products.  The following are some, but not all, of the potential risks that can occur: fever and allergic reactions, hemolytic reactions, transmission of diseases such as Hepatitis, AIDS and Cytomegalovirus (CMV) and fluid  overload.  In the event that I wish to have an autologous transfusion of my own blood, or a directed donor transfusion, I will discuss this with my physician.   Check only if Refusing Blood or Blood Products  I understand refusal of blood or blood products as deemed necessary by my physician may have serious consequences to my condition to include possible death. I hereby assume responsibility for my refusal and release the hospital, its personnel, and my physicians from any responsibility for the consequences of my refusal.         o  Refuse         5.   I authorize the use of any specimen, organs, tissues, body parts or foreign objects that may be removed from my body during the operation/procedure for diagnosis, research or teaching purposes and their subsequent disposal by hospital authorities.  I also authorize the release of specimen test results and/or written reports to my treating physician on the hospital medical staff or other referring or consulting physicians involved in my care, at the discretion of the Pathologist or my treating physician.    6.   I consent to the photographing or videotaping of the operations or procedures to be performed, including appropriate portions of my body for medical, scientific, or educational purposes, provided my identity is not revealed by the pictures or by descriptive texts accompanying them.  If the procedure has been photographed/videotaped, the surgeon will obtain the original picture, image, videotape or CD.  The hospital will not be responsible for storage, release or maintenance of the picture, image, tape or CD.    7.   I consent to the presence of a  or observers in the operating room as deemed necessary by my physician or their designees.    8.   I recognize that in the event my procedure results in extended X-Ray/fluoroscopy time, I may develop a skin reaction.    9. If I have a Do Not Attempt Resuscitation (DNAR) order in place, that status  will be suspended while in the operating room, procedural suite, and during the recovery period unless otherwise explicitly stated by me (or a person authorized to consent on my behalf). The surgeon or my attending physician will determine when the applicable recovery period ends for purposes of reinstating the DNAR order.  10. Patients having a sterilization procedure: I understand that if the procedure is successful the results will be permanent and it will therefore be impossible for me to inseminate, conceive, or bear children.  I also understand that the procedure is intended to result in sterility, although the result has not been guaranteed.   11. I acknowledge that my physician has explained sedation/analgesia administration to me including the risk and benefits I consent to the administration of sedation/analgesia as may be necessary or desirable in the judgment of my physician.    I CERTIFY THAT I HAVE READ AND FULLY UNDERSTAND THE ABOVE CONSENT TO OPERATION and/or OTHER PROCEDURE.        ____________________________________       _________________________________      ______________________________  Signature of Patient         Signature of Responsible Person        Printed Name of Responsible Person        ____________________________________      _________________________________      ______________________________       Signature of Witness          Relationship to Patient                       Date                                       Time  Patient Name: Drew PALMA Maudeabraham  : 1975    Reviewed: 2024   Printed: 2024  Medical Record #: [id number] Page 1 of 1

## (undated) NOTE — LETTER
[address]  [address]  Authorization for Surgical Operation and Procedure     Date:___________                                                                                                         Time:__________  I hereby authorize * Surgery not found *, my physician and his/her assistants (if applicable), which may include medical students, residents, and/or fellows, to perform the following surgical operation/ procedure and administer such anesthesia as may be determined necessary by my physician:  Operation/Procedure name (s)  on Drew Ozuna   2.   I recognize that during the surgical operation/procedure, unforeseen conditions may necessitate additional or different procedures than those listed above.  I, therefore, further authorize and request that the above-named surgeon, assistants, or designees perform such procedures as are, in their judgment, necessary and desirable.    3.   My surgeon/physician has discussed prior to my surgery the potential benefits, risks and side effects of this procedure; the likelihood of achieving goals; and potential problems that might occur during recuperation.  They also discussed reasonable alternatives to the procedure, including risks, benefits, and side effects related to the alternatives and risks related to not receiving this procedure.  I have had all my questions answered and I acknowledge that no guarantee has been made as to the result that may be obtained.    4.   Should the need arise during my operation/procedure, which includes change of level of care prior to discharge, I also consent to the administration of blood and/or blood products.  Further, I understand that despite careful testing and screening of blood or blood products by collecting agencies, I may still be subject to ill effects as a result of receiving a blood transfusion and/or blood products.  The following are some, but not all, of the potential  risks that can occur: fever and allergic reactions, hemolytic reactions, transmission of diseases such as Hepatitis, AIDS and Cytomegalovirus (CMV) and fluid overload.  In the event that I wish to have an autologous transfusion of my own blood, or a directed donor transfusion, I will discuss this with my physician.  Check only if Refusing Blood or Blood Products  I understand refusal of blood or blood products as deemed necessary by my physician may have serious consequences to my condition to include possible death. I hereby assume responsibility for my refusal and release the hospital, its personnel, and my physicians from any responsibility for the consequences of my refusal.          o  Refuse      5.   I authorize the use of any specimen, organs, tissues, body parts or foreign objects that may be removed from my body during the operation/procedure for diagnosis, research or teaching purposes and their subsequent disposal by hospital authorities.  I also authorize the release of specimen test results and/or written reports to my treating physician on the hospital medical staff or other referring or consulting physicians involved in my care, at the discretion of the Pathologist or my treating physician.    6.   I consent to the photographing or videotaping of the operations or procedures to be performed, including appropriate portions of my body for medical, scientific, or educational purposes, provided my identity is not revealed by the pictures or by descriptive texts accompanying them.  If the procedure has been photographed/videotaped, the surgeon will obtain the original picture, image, videotape or CD.  The hospital will not be responsible for storage, release or maintenance of the picture, image, tape or CD.    7.   I consent to the presence of a  or observers in the operating room as deemed necessary by my physician or their designees.    8.   I recognize that in the event my procedure  results in extended X-Ray/fluoroscopy time, I may develop a skin reaction.    9. If I have a Do Not Attempt Resuscitation (DNAR) order in place, that status will be suspended while in the operating room, procedural suite, and during the recovery period unless otherwise explicitly stated by me (or a person authorized to consent on my behalf). The surgeon or my attending physician will determine when the applicable recovery period ends for purposes of reinstating the DNAR order.  10. Patients having a sterilization procedure: I understand that if the procedure is successful the results will be permanent and it will therefore be impossible for me to inseminate, conceive, or bear children.  I also understand that the procedure is intended to result in sterility, although the result has not been guaranteed.   11. I acknowledge that my physician has explained sedation/analgesia administration to me including the risk and benefits I consent to the administration of sedation/analgesia as may be necessary or desirable in the judgment of my physician.    I CERTIFY THAT I HAVE READ AND FULLY UNDERSTAND THE ABOVE CONSENT TO OPERATION and/or OTHER PROCEDURE.    _________________________________________  __________________________________  Signature of Patient     Signature of Responsible Person         ___________________________________         Printed Name of Responsible Person           _________________________________                 Relationship to Patient  _________________________________________  ______________________________  Signature of Witness          Date  Time      Patient Name: Drew Ozuna     : 1975                 Printed: 2024     Medical Record #: [id number]                     Page 1 of [address] [place] [address]  [id number]    Consent for Anesthesia    I, Drew Ozuna agree to be cared for by an  anesthesiologist, who is specially trained to monitor me and give me medicine to put me to sleep or keep me comfortable during my procedure    I understand that my anesthesiologist is not an employee or agent of [facility] or [employer]. He or she works for [employer].    As the patient asking for anesthesia services, I agree to:  Allow the anesthesiologist (anesthesia doctor) to give me medicine and do additional procedures as necessary. Some examples are: Starting or using an “IV” to give me medicine, fluids or blood during my procedure, and having a breathing tube placed to help me breathe when I’m asleep (intubation). In the event that my heart stops working properly, I understand that my anesthesiologist will make every effort to sustain my life, unless otherwise directed by [facility] Do Not Resuscitate documents.  Tell my anesthesia doctor before my procedure:  If I am pregnant.  The last time that I ate or drank.  All of the medicines I take (including prescriptions, herbal supplements, and pills I can buy without a prescription (including street drugs/illegal medications). Failure to inform my anesthesiologist about these medicines may increase my risk of anesthetic complications.  If I am allergic to anything or have had a reaction to anesthesia before.  I understand how the anesthesia medicine will help me (benefits).  I understand that with any type of anesthesia medicine there are risks:  The most common risks are: nausea, vomiting, sore throat, muscle soreness, damage to my eyes, mouth, or teeth (from breathing tube placement).  Rare risks include: remembering what happened during my procedure, allergic reactions to medications, injury to my airway, heart, lungs, vision, nerves, or muscles and in extremely rare instances death.  My doctor has explained to me other choices available to me for my care (alternatives).  Pregnant Patients (“epidural”):  I understand  that the risks of having an epidural (medicine given into my back to help control pain during labor), include itching, low blood pressure, difficulty urinating, headache or slowing of the baby’s heart. Very rare risks include infection, bleeding, seizure, irregular heart rhythms and nerve injury.  Regional Anesthesia (“spinal”, “epidural”, & “nerve blocks”):  I understand that rare but potential complications include headache, bleeding, infection, seizure, irregular heart rhythms, and nerve injury.    I can change my mind about having anesthesia services at any time before I get the medicine.    _____________________________________________________________________________  Patient (or Representative) Signature/Relationship to Patient  Date   Time    _____________________________________________________________________________   Name (if used)    Language/Organization   Time    _____________________________________________________________________________  Anesthesiologist Signature     Date   Time  I have discussed the procedure and information above with the patient (or patient’s representative) and answered their questions. The patient or their representative has agreed to have anesthesia services.    _____________________________________________________________________________  Witness        Date   Time  I have verified that the signature is that of the patient or patient’s representative, and that it was signed before the procedure  Patient Name: Drew Ozuna     : 1975                 Printed: 2024     Medical Record #: [id number]                     Page 2 of 2

## (undated) NOTE — LETTER
[address]  [address]  Authorization for Surgical Operation and Procedure     Date:___________                                                                                                         Time:__________  I hereby authorize Surgeon(s):  Randell Massey MD, my physician and his/her assistants (if applicable), which may include medical students, residents, and/or fellows, to perform the following surgical operation/ procedure and administer such anesthesia as may be determined necessary by my physician:  Operation/Procedure name (s) Procedure(s):  CORONARY ARTERY BYPASS GRAFT on Drew Ozuna   2.   I recognize that during the surgical operation/procedure, unforeseen conditions may necessitate additional or different procedures than those listed above.  I, therefore, further authorize and request that the above-named surgeon, assistants, or designees perform such procedures as are, in their judgment, necessary and desirable.    3.   My surgeon/physician has discussed prior to my surgery the potential benefits, risks and side effects of this procedure; the likelihood of achieving goals; and potential problems that might occur during recuperation.  They also discussed reasonable alternatives to the procedure, including risks, benefits, and side effects related to the alternatives and risks related to not receiving this procedure.  I have had all my questions answered and I acknowledge that no guarantee has been made as to the result that may be obtained.    4.   Should the need arise during my operation/procedure, which includes change of level of care prior to discharge, I also consent to the administration of blood and/or blood products.  Further, I understand that despite careful testing and screening of blood or blood products by collecting agencies, I may still be subject to ill effects as a result of receiving a blood transfusion and/or blood products.  The  following are some, but not all, of the potential risks that can occur: fever and allergic reactions, hemolytic reactions, transmission of diseases such as Hepatitis, AIDS and Cytomegalovirus (CMV) and fluid overload.  In the event that I wish to have an autologous transfusion of my own blood, or a directed donor transfusion, I will discuss this with my physician.  Check only if Refusing Blood or Blood Products  I understand refusal of blood or blood products as deemed necessary by my physician may have serious consequences to my condition to include possible death. I hereby assume responsibility for my refusal and release the hospital, its personnel, and my physicians from any responsibility for the consequences of my refusal.          o  Refuse      5.   I authorize the use of any specimen, organs, tissues, body parts or foreign objects that may be removed from my body during the operation/procedure for diagnosis, research or teaching purposes and their subsequent disposal by hospital authorities.  I also authorize the release of specimen test results and/or written reports to my treating physician on the hospital medical staff or other referring or consulting physicians involved in my care, at the discretion of the Pathologist or my treating physician.    6.   I consent to the photographing or videotaping of the operations or procedures to be performed, including appropriate portions of my body for medical, scientific, or educational purposes, provided my identity is not revealed by the pictures or by descriptive texts accompanying them.  If the procedure has been photographed/videotaped, the surgeon will obtain the original picture, image, videotape or CD.  The hospital will not be responsible for storage, release or maintenance of the picture, image, tape or CD.    7.   I consent to the presence of a  or observers in the operating room as deemed necessary by my physician or their designees.     8.   I recognize that in the event my procedure results in extended X-Ray/fluoroscopy time, I may develop a skin reaction.    9. If I have a Do Not Attempt Resuscitation (DNAR) order in place, that status will be suspended while in the operating room, procedural suite, and during the recovery period unless otherwise explicitly stated by me (or a person authorized to consent on my behalf). The surgeon or my attending physician will determine when the applicable recovery period ends for purposes of reinstating the DNAR order.  10. Patients having a sterilization procedure: I understand that if the procedure is successful the results will be permanent and it will therefore be impossible for me to inseminate, conceive, or bear children.  I also understand that the procedure is intended to result in sterility, although the result has not been guaranteed.   11. I acknowledge that my physician has explained sedation/analgesia administration to me including the risk and benefits I consent to the administration of sedation/analgesia as may be necessary or desirable in the judgment of my physician.    I CERTIFY THAT I HAVE READ AND FULLY UNDERSTAND THE ABOVE CONSENT TO OPERATION and/or OTHER PROCEDURE.    _________________________________________  __________________________________  Signature of Patient     Signature of Responsible Person         ___________________________________         Printed Name of Responsible Person           _________________________________                 Relationship to Patient  _________________________________________  ______________________________  Signature of Witness          Date  Time      Patient Name: Drew PALMA Maudeabraham     : 1975                 Printed: 2024     Medical Record #: [id number]                     Page 1 of [address] [place] [address] [place]  [address]    Consent for Anesthesia    IDrew  Laith agree to be cared for by an anesthesiologist, who is specially trained to monitor me and give me medicine to put me to sleep or keep me comfortable during my procedure    I understand that my anesthesiologist is not an employee or agent of [facility] [employer]. He or she works for [employer].    As the patient asking for anesthesia services, I agree to:  Allow the anesthesiologist (anesthesia doctor) to give me medicine and do additional procedures as necessary. Some examples are: Starting or using an “IV” to give me medicine, fluids or blood during my procedure, and having a breathing tube placed to help me breathe when I’m asleep (intubation). In the event that my heart stops working properly, I understand that my anesthesiologist will make every effort to sustain my life, unless otherwise directed by [facility] Do Not Resuscitate documents.  Tell my anesthesia doctor before my procedure:  If I am pregnant.  The last time that I ate or drank.  All of the medicines I take (including prescriptions, herbal supplements, and pills I can buy without a prescription (including street drugs/illegal medications). Failure to inform my anesthesiologist about these medicines may increase my risk of anesthetic complications.  If I am allergic to anything or have had a reaction to anesthesia before.  I understand how the anesthesia medicine will help me (benefits).  I understand that with any type of anesthesia medicine there are risks:  The most common risks are: nausea, vomiting, sore throat, muscle soreness, damage to my eyes, mouth, or teeth (from breathing tube placement).  Rare risks include: remembering what happened during my procedure, allergic reactions to medications, injury to my airway, heart, lungs, vision, nerves, or muscles and in extremely rare instances death.  My doctor has explained to me other choices available to me for my care  (alternatives).  Pregnant Patients (“epidural”):  I understand that the risks of having an epidural (medicine given into my back to help control pain during labor), include itching, low blood pressure, difficulty urinating, headache or slowing of the baby’s heart. Very rare risks include infection, bleeding, seizure, irregular heart rhythms and nerve injury.  Regional Anesthesia (“spinal”, “epidural”, & “nerve blocks”):  I understand that rare but potential complications include headache, bleeding, infection, seizure, irregular heart rhythms, and nerve injury.    I can change my mind about having anesthesia services at any time before I get the medicine.    _____________________________________________________________________________  Patient (or Representative) Signature/Relationship to Patient  Date   Time    _____________________________________________________________________________   Name (if used)    Language/Organization   Time    _____________________________________________________________________________  Anesthesiologist Signature     Date   Time  I have discussed the procedure and information above with the patient (or patient’s representative) and answered their questions. The patient or their representative has agreed to have anesthesia services.    _____________________________________________________________________________  Witness        Date   Time  I have verified that the signature is that of the patient or patient’s representative, and that it was signed before the procedure  Patient Name: Drew Ozuna     : 1975                 Printed: 2024     Medical Record #: [id number]                     Page 2 of 2

## (undated) NOTE — ED AVS SNAPSHOT
Maile Hardin   MRN: [id number]    Department:  [facility] Emergency Department in [place]   Date of Visit:  11/8/2019           Disclosure     Insurance plans vary and the physician(s) referred by the ER may not be covered by your plan.  Please c tell this physician (or your personal doctor if your instructions are to return to your personal doctor) about any new or lasting problems. The primary care or specialist physician will see patients referred from the [facility] Emergency Department.  Seamus Caba